# Patient Record
Sex: MALE | Race: OTHER | HISPANIC OR LATINO
[De-identification: names, ages, dates, MRNs, and addresses within clinical notes are randomized per-mention and may not be internally consistent; named-entity substitution may affect disease eponyms.]

---

## 2021-03-20 ENCOUNTER — APPOINTMENT (OUTPATIENT)
Age: 66
End: 2021-03-20
Payer: COMMERCIAL

## 2021-03-20 PROCEDURE — 0001A: CPT

## 2021-05-07 ENCOUNTER — APPOINTMENT (OUTPATIENT)
Dept: DISASTER EMERGENCY | Facility: CLINIC | Age: 66
End: 2021-05-07

## 2021-05-08 LAB — SARS-COV-2 N GENE NPH QL NAA+PROBE: NOT DETECTED

## 2021-05-10 ENCOUNTER — LABORATORY RESULT (OUTPATIENT)
Age: 66
End: 2021-05-10

## 2021-05-10 ENCOUNTER — APPOINTMENT (OUTPATIENT)
Dept: PULMONOLOGY | Facility: CLINIC | Age: 66
End: 2021-05-10
Payer: COMMERCIAL

## 2021-05-10 VITALS
DIASTOLIC BLOOD PRESSURE: 87 MMHG | HEIGHT: 63 IN | BODY MASS INDEX: 28.7 KG/M2 | SYSTOLIC BLOOD PRESSURE: 171 MMHG | HEART RATE: 84 BPM | TEMPERATURE: 98 F | WEIGHT: 162 LBS | OXYGEN SATURATION: 97 %

## 2021-05-10 VITALS — WEIGHT: 159 LBS | HEIGHT: 63 IN | TEMPERATURE: 98.1 F | HEART RATE: 81 BPM | BODY MASS INDEX: 28.17 KG/M2

## 2021-05-10 DIAGNOSIS — Z86.79 PERSONAL HISTORY OF OTHER DISEASES OF THE CIRCULATORY SYSTEM: ICD-10-CM

## 2021-05-10 DIAGNOSIS — Z87.891 PERSONAL HISTORY OF NICOTINE DEPENDENCE: ICD-10-CM

## 2021-05-10 DIAGNOSIS — Z00.00 ENCOUNTER FOR GENERAL ADULT MEDICAL EXAMINATION W/OUT ABNORMAL FINDINGS: ICD-10-CM

## 2021-05-10 DIAGNOSIS — Z86.39 PERSONAL HISTORY OF OTHER ENDOCRINE, NUTRITIONAL AND METABOLIC DISEASE: ICD-10-CM

## 2021-05-10 DIAGNOSIS — Z57.9 OCCUPATIONAL EXPOSURE TO UNSPECIFIED RISK FACTOR: ICD-10-CM

## 2021-05-10 PROCEDURE — 94726 PLETHYSMOGRAPHY LUNG VOLUMES: CPT

## 2021-05-10 PROCEDURE — 99072 ADDL SUPL MATRL&STAF TM PHE: CPT

## 2021-05-10 PROCEDURE — ZZZZZ: CPT

## 2021-05-10 PROCEDURE — 94729 DIFFUSING CAPACITY: CPT

## 2021-05-10 PROCEDURE — 94010 BREATHING CAPACITY TEST: CPT

## 2021-05-10 PROCEDURE — 99215 OFFICE O/P EST HI 40 MIN: CPT | Mod: 25

## 2021-05-10 RX ORDER — SILDENAFIL 20 MG/1
20 TABLET ORAL
Refills: 0 | Status: ACTIVE | COMMUNITY

## 2021-05-10 SDOH — HEALTH STABILITY - PHYSICAL HEALTH: OCCUPATIONAL EXPOSURE TO UNSPECIFIED RISK FACTOR: Z57.9

## 2021-05-10 NOTE — PHYSICAL EXAM
[No Acute Distress] : no acute distress [Normal Oropharynx] : normal oropharynx [III] : Mallampati Class: III [No Neck Mass] : no neck mass [No Abnormalities] : no abnormalities [Benign] : benign [Normal Gait] : normal gait [No Clubbing] : no clubbing [Normal Color/ Pigmentation] : normal color/ pigmentation [No Focal Deficits] : no focal deficits [Oriented x3] : oriented x3 [TextBox_68] : Bilateral fine crackles

## 2021-05-10 NOTE — HISTORY OF PRESENT ILLNESS
[TextBox_4] : This letter  is regarding your patient  who  attended pulmonary out patient office today.  I have reviewed  patient's  past history, social history, family history and medication list. I also  reviewed nurse practitioners/ and fellows  notes and assessment and agree with it.  \par The patient was referred by Dr.ALEX HODGE------- 65-year-old male history of interstitial lung disease ------------ no history of any collagen vascular disorder------- no family history of interstitial lung disease------ brother has lung cancer----------\par \par ------No history of , fever, chills , rigors, chest pain, or hemoptysis. Questionable history of Raynaud's phenomenon. No h/o significant weight loss in last few months. No history of liver dysfunction , collagen vascular disorder or chronic thromboembolic disease. I would classify the patient's dyspnea as WHO  FUNCTIONAL CLASS II--------\par \par ----Echo  date------ not available\par ----Pft date--------- May 2021-----borderline restrictive lung defect is \par ----Ct scan date------- 2021----MASON PERISI-----mild interstitial lung disease at the bases with regions of focal honeycombing -----------------------???   UIP pattern \par ---\par \par

## 2021-05-10 NOTE — DISCUSSION/SUMMARY
[FreeTextEntry1] : ---Assessment plan----------The patient has been referred here for further opinion regarding pulmonary problem,\par - 65-year-old male history of interstitial lung disease ------------ no history of any collagen vascular disorder--- CT suggestive of UIP pattern\par I need to review the CT scan myself to decide  IF if we need an open lung biopsy----or weakened state of and treated as a UIP pattern with antifibrotic treatment\par 1 labs\par 2 CPET\par 3 PSG\par 4-follow-up in 6 weeks\par \par Thanks for allowing  me to participate  in the care of this patient.  Patient at this time  will follow  the above mentioned recommendations and return back for follow up visit. If you have any questions  I can be reached  at #[phone] (beeper)  or  [phone] (office).\par \par Daniel Garzon MD, [facility] \par Director, Pulmonary Hypertension Program \par [facility] \par Division of Pulmonary, Critical Care and Sleep Medicine \par  Professor of Medicine \par [employer]\par

## 2021-05-10 NOTE — REVIEW OF SYSTEMS
[Cough] : cough [Fever] : no fever [Dry Eyes] : no dry eyes [Chest Discomfort] : no chest discomfort [Hay Fever] : no hay fever [GERD] : no gerd [Nocturia] : no nocturia [Arthralgias] : no arthralgias [Raynaud] : no raynaud [Anemia] : no anemia [Headache] : no headache [Depression] : no depression [Diabetes] : no diabetes

## 2021-05-11 LAB
25(OH)D3 SERPL-MCNC: 34.5 NG/ML
A1AT SERPL-MCNC: 134 MG/DL
ALBUMIN SERPL ELPH-MCNC: 4.8 G/DL
ALP BLD-CCNC: 89 U/L
ALT SERPL-CCNC: 16 U/L
ANION GAP SERPL CALC-SCNC: 15 MMOL/L
APTT BLD: 27.8 SEC
AST SERPL-CCNC: 18 U/L
BASOPHILS # BLD AUTO: 0.06 K/UL
BASOPHILS NFR BLD AUTO: 1 %
BILIRUB SERPL-MCNC: 0.2 MG/DL
BUN SERPL-MCNC: 17 MG/DL
CALCIUM SERPL-MCNC: 9.8 MG/DL
CALCIUM SERPL-MCNC: 9.8 MG/DL
CCP AB SER IA-ACNC: <8 UNITS
CHLORIDE SERPL-SCNC: 103 MMOL/L
CO2 SERPL-SCNC: 24 MMOL/L
CREAT SERPL-MCNC: 0.93 MG/DL
CRP SERPL-MCNC: <3 MG/L
DEPRECATED KAPPA LC FREE/LAMBDA SER: 1.06 RATIO
ENA SCL70 IGG SER IA-ACNC: <0.2 AL
ENA SS-A AB SER IA-ACNC: <0.2 AL
ENA SS-B AB SER IA-ACNC: <0.2 AL
EOSINOPHIL # BLD AUTO: 0.28 K/UL
EOSINOPHIL NFR BLD AUTO: 4.4 %
ERYTHROCYTE [SEDIMENTATION RATE] IN BLOOD BY WESTERGREN METHOD: 45 MM/HR
ESTIMATED AVERAGE GLUCOSE: 126 MG/DL
FOLATE RBC-MCNC: 1098 NG/ML
GLUCOSE SERPL-MCNC: 102 MG/DL
HBA1C MFR BLD HPLC: 6 %
HCT VFR BLD CALC: 44.9 %
HCT VFR BLD CALC: 45 %
HCYS SERPL-MCNC: 11.6 UMOL/L
HGB BLD-MCNC: 14.3 G/DL
IGA SER QL IEP: 264 MG/DL
IGG SER QL IEP: 1337 MG/DL
IGM SER QL IEP: 55 MG/DL
IMM GRANULOCYTES NFR BLD AUTO: 0.2 %
INR PPP: 0.94 RATIO
KAPPA LC CSF-MCNC: 1.24 MG/DL
KAPPA LC SERPL-MCNC: 1.32 MG/DL
LYMPHOCYTES # BLD AUTO: 1.46 K/UL
LYMPHOCYTES NFR BLD AUTO: 23.2 %
MAN DIFF?: NORMAL
MCHC RBC-ENTMCNC: 27.6 PG
MCHC RBC-ENTMCNC: 31.8 GM/DL
MCV RBC AUTO: 86.7 FL
MONOCYTES # BLD AUTO: 0.44 K/UL
MONOCYTES NFR BLD AUTO: 7 %
NEUTROPHILS # BLD AUTO: 4.05 K/UL
NEUTROPHILS NFR BLD AUTO: 64.2 %
NT-PROBNP SERPL-MCNC: 30 PG/ML
PARATHYROID HORMONE INTACT: 48 PG/ML
PHOSPHATE SERPL-MCNC: 2.9 MG/DL
PLATELET # BLD AUTO: 253 K/UL
POTASSIUM SERPL-SCNC: 4.5 MMOL/L
PROT SERPL-MCNC: 8 G/DL
PT BLD: 11.1 SEC
RBC # BLD: 5.19 M/UL
RBC # FLD: 12.6 %
RF+CCP IGG SER-IMP: NEGATIVE
RHEUMATOID FACT SER QL: <10 IU/ML
SODIUM SERPL-SCNC: 142 MMOL/L
T3 SERPL-MCNC: 132 NG/DL
T3FREE SERPL-MCNC: 3.36 PG/ML
T3RU NFR SERPL: 1.2 TBI
T4 FREE SERPL-MCNC: 0.9 NG/DL
TSH SERPL-ACNC: 2.02 UIU/ML
URATE SERPL-MCNC: 4.7 MG/DL
VIT B12 SERPL-MCNC: 542 PG/ML
WBC # FLD AUTO: 6.3 K/UL

## 2021-05-12 LAB
CARDIOLIPIN AB SER IA-ACNC: NEGATIVE
MPO AB + PR3 PNL SER: NORMAL

## 2021-05-13 LAB
ANACR T: NEGATIVE
M TB IFN-G BLD-IMP: POSITIVE
QUANTIFERON TB PLUS MITOGEN MINUS NIL: 1.77 IU/ML
QUANTIFERON TB PLUS NIL: 0.03 IU/ML
QUANTIFERON TB PLUS TB1 MINUS NIL: 0.5 IU/ML
QUANTIFERON TB PLUS TB2 MINUS NIL: 0.38 IU/ML
TOTAL IGE SMQN RAST: 68 KU/L

## 2021-05-18 LAB — T4 FREE SERPL DIALY-MCNC: 0.74 NG/DL

## 2021-05-20 PROBLEM — Z00.00 ENCOUNTER FOR PREVENTIVE HEALTH EXAMINATION: Status: ACTIVE | Noted: 2021-05-20

## 2021-05-25 LAB
EJ AB SER QL: NEGATIVE
ENA JO1 AB SER IA-ACNC: <20 UNITS
ENA PM/SCL AB SER-ACNC: <20 UNITS
ENA SM+RNP AB SER IA-ACNC: <20 UNITS
ENA SS-A IGG SER QL: <20 UNITS
FIBRILLARIN AB SER QL: NEGATIVE
KU AB SER QL: NEGATIVE
MDA-5 (P140)(CADM-140): <20 UNITS
MI2 AB SER QL: NEGATIVE
NXP-2 (P140): <20 UNITS
OJ AB SER QL: NEGATIVE
PL12 AB SER QL: NEGATIVE
PL7 AB SER QL: NEGATIVE
SRP AB SERPL QL: NEGATIVE
TIF GAMMA (P155/140): <20 UNITS
U2 SNRNP AB SER QL: NEGATIVE

## 2021-07-19 ENCOUNTER — APPOINTMENT (OUTPATIENT)
Dept: PULMONOLOGY | Facility: CLINIC | Age: 66
End: 2021-07-19
Payer: COMMERCIAL

## 2021-07-19 VITALS
DIASTOLIC BLOOD PRESSURE: 78 MMHG | TEMPERATURE: 97.1 F | HEART RATE: 56 BPM | WEIGHT: 159 LBS | SYSTOLIC BLOOD PRESSURE: 151 MMHG | OXYGEN SATURATION: 98 % | BODY MASS INDEX: 28.17 KG/M2 | HEIGHT: 63 IN

## 2021-07-19 PROCEDURE — 99214 OFFICE O/P EST MOD 30 MIN: CPT

## 2021-07-19 PROCEDURE — 99072 ADDL SUPL MATRL&STAF TM PHE: CPT

## 2021-07-19 NOTE — DISCUSSION/SUMMARY
[FreeTextEntry1] : ---Assessment plan----------The patient has been referred here for further opinion regarding pulmonary problem,\par - 65-year-old male history of interstitial lung disease ------------ no history of any collagen vascular disorder--- CT suggestive of UIP pattern\par I need to review the CT scan myself to decide  IF if we need an open lung biopsy----or  treated as a UIP pattern with antifibrotic treatment\par 1 REPEAT  CT CHEST \par 2 CPET\par 3 PSG\par 4-follow-up in 6 weeks\par \par Thanks for allowing  me to participate  in the care of this patient.  Patient at this time  will follow  the above mentioned recommendations and return back for follow up visit. If you have any questions  I can be reached  at #[phone] (beeper)  or  [phone] (office).\par \par Daniel Garzon MD, [facility] \par Director, Pulmonary Hypertension Program \par [facility] \par Division of Pulmonary, Critical Care and Sleep Medicine \par  Professor of Medicine \par [employer]\par

## 2021-08-04 ENCOUNTER — APPOINTMENT (OUTPATIENT)
Dept: SLEEP CENTER | Facility: CLINIC | Age: 66
End: 2021-08-04
Payer: COMMERCIAL

## 2021-08-04 ENCOUNTER — OUTPATIENT (OUTPATIENT)
Dept: OUTPATIENT SERVICES | Facility: HOSPITAL | Age: 66
LOS: 1 days | End: 2021-08-04
Payer: COMMERCIAL

## 2021-08-04 PROCEDURE — 95806 SLEEP STUDY UNATT&RESP EFFT: CPT

## 2021-08-04 PROCEDURE — 95806 SLEEP STUDY UNATT&RESP EFFT: CPT | Mod: 26

## 2021-08-06 ENCOUNTER — NON-APPOINTMENT (OUTPATIENT)
Age: 66
End: 2021-08-06

## 2021-08-10 DIAGNOSIS — G47.33 OBSTRUCTIVE SLEEP APNEA (ADULT) (PEDIATRIC): ICD-10-CM

## 2021-08-11 ENCOUNTER — NON-APPOINTMENT (OUTPATIENT)
Age: 66
End: 2021-08-11

## 2021-08-12 ENCOUNTER — OUTPATIENT (OUTPATIENT)
Dept: OUTPATIENT SERVICES | Facility: HOSPITAL | Age: 66
LOS: 1 days | End: 2021-08-12
Payer: COMMERCIAL

## 2021-08-12 ENCOUNTER — APPOINTMENT (OUTPATIENT)
Dept: CT IMAGING | Facility: CLINIC | Age: 66
End: 2021-08-12
Payer: COMMERCIAL

## 2021-08-12 DIAGNOSIS — Z00.8 ENCOUNTER FOR OTHER GENERAL EXAMINATION: ICD-10-CM

## 2021-08-12 DIAGNOSIS — J84.9 INTERSTITIAL PULMONARY DISEASE, UNSPECIFIED: ICD-10-CM

## 2021-08-12 PROCEDURE — 71250 CT THORAX DX C-: CPT | Mod: 26

## 2021-08-12 PROCEDURE — 71250 CT THORAX DX C-: CPT

## 2021-08-18 ENCOUNTER — TRANSCRIPTION ENCOUNTER (OUTPATIENT)
Age: 66
End: 2021-08-18

## 2021-09-20 NOTE — HISTORY OF PRESENT ILLNESS
[TextBox_4] : This letter  is regarding your patient  who  attended pulmonary out patient office today.  I have reviewed  patient's  past history, social history, family history and medication list. I also  reviewed nurse practitioners/ and fellows  notes and assessment and agree with it.  \par The patient was referred by Dr.ALEX HODGE------- 65-year-old male history of interstitial lung disease ------------ no history of any collagen vascular disorder------- no family history of interstitial lung disease------ brother has lung cancer----------\par \par ------No history of , fever, chills , rigors, chest pain, or hemoptysis. Questionable history of Raynaud's phenomenon. No h/o significant weight loss in last few months. No history of liver dysfunction , collagen vascular disorder or chronic thromboembolic disease. I would classify the patient's dyspnea as WHO  FUNCTIONAL CLASS II--------\par \par ----Echo  date------ not available\par ----Pft date--------- May 2021-----borderline restrictive lung defect is \par ----Ct scan date------- 2021----MASON PERISI-----mild interstitial lung disease at the bases with regions of focal honeycombing -----------------------???   UIP pattern \par ---\par \par  I have discussed with the Attending the patient's status, as well as the H&P and the fetal status. The Attending agreed with the suggested management.

## 2021-10-05 ENCOUNTER — APPOINTMENT (OUTPATIENT)
Dept: PULMONOLOGY | Facility: CLINIC | Age: 66
End: 2021-10-05

## 2021-10-05 ENCOUNTER — APPOINTMENT (OUTPATIENT)
Dept: PULMONOLOGY | Facility: CLINIC | Age: 66
End: 2021-10-05
Payer: COMMERCIAL

## 2021-10-05 ENCOUNTER — MED ADMIN CHARGE (OUTPATIENT)
Age: 66
End: 2021-10-05

## 2021-10-05 VITALS
TEMPERATURE: 97 F | SYSTOLIC BLOOD PRESSURE: 149 MMHG | WEIGHT: 159 LBS | BODY MASS INDEX: 28.17 KG/M2 | RESPIRATION RATE: 15 BRPM | DIASTOLIC BLOOD PRESSURE: 85 MMHG | HEIGHT: 63 IN | OXYGEN SATURATION: 99 % | HEART RATE: 60 BPM

## 2021-10-05 DIAGNOSIS — Z23 ENCOUNTER FOR IMMUNIZATION: ICD-10-CM

## 2021-10-05 PROCEDURE — 99215 OFFICE O/P EST HI 40 MIN: CPT | Mod: 25

## 2021-10-05 PROCEDURE — 90686 IIV4 VACC NO PRSV 0.5 ML IM: CPT

## 2021-10-05 PROCEDURE — ZZZZZ: CPT

## 2021-10-05 PROCEDURE — 94618 PULMONARY STRESS TESTING: CPT

## 2021-10-05 PROCEDURE — 99072 ADDL SUPL MATRL&STAF TM PHE: CPT

## 2021-10-05 PROCEDURE — G0008: CPT

## 2021-10-05 NOTE — HISTORY OF PRESENT ILLNESS
[TextBox_4] : This letter  is regarding your patient  who  attended pulmonary out patient office today.  I have reviewed  patient's  past history, social history, family history and medication list. I also  reviewed nurse practitioners/ and fellows  notes and assessment and agree with it.  \par The patient was referred by Dr.ALEX HODGE------- 65-year-old male history of interstitial lung disease ------------ no history of any collagen vascular disorder------- no family history of interstitial lung disease------ BROTHER HAS LUNG TRASNPLANT [CAUSE PT NOT SURE] -----\par \par ------No history of , fever, chills , rigors, chest pain, or hemoptysis. Questionable history of Raynaud's phenomenon. No h/o significant weight loss in last few months. No history of liver dysfunction , collagen vascular disorder or chronic thromboembolic disease. I would classify the patient's dyspnea as WHO  FUNCTIONAL CLASS II--------\par \par ----Echo  date------ not available\par ----Pft date--------- May 2021-----borderline restrictive lung defect is \par ----Ct scan date------- 2021----ZWANGER PERISI-----mild interstitial lung disease at the bases with regions of focal honeycombing -----6MWT  525 METERS NO  DESATURATION----------------\par ---\par \par ct chest 8/2021 \par IMPRESSION:\par Interstitial lung disease marked by peripheral reticulation, groundglass opacity and traction bronchiectasis with a lower lobe predominance. No honeycombing.\par \par \par hst did not show KWADWO\par \par 10/2021-- ILD--no resp complaints -NNEDS CPET ---NO KWADWO ON PSEG--------WILL TRY SA SHORT  COURSE OF STEROIDS  ---QUNAT GOLD POSITIVE\par \par

## 2021-10-05 NOTE — DISCUSSION/SUMMARY
[FreeTextEntry1] : ---Assessment plan----------The patient has been referred here for further opinion regarding pulmonary problem,\par - 65-year-old male history of interstitial lung disease ------------ no history of any collagen vascular disorder--- CT suggestive of UIP pattern  ---[ BROTEHRHAS LUNG TRANSPLANT   CAUSE NOT CLEAR]-----\par \par 1   prenisosne 10 mg po qd 6 weeks\par 2 echo\par 3 cpet\par 4  influenza vac given today\par 5 QUANTGOLD  positive---will   try Rifapentine and INH  weeekly regimen for 3 months ---will   start after the prednisone course is finished-\par \par f/u in 6 weeks\par Thanks for allowing  me to participate  in the care of this patient.  Patient at this time  will follow  the above mentioned recommendations and return back for follow up visit. If you have any questions  I can be reached  at #[phone] (beeper)  or  [phone] (office).\par \par Daniel Garzon MD, FCCP \par Director, Pulmonary Hypertension Program \par [facility] \par Division of Pulmonary, Critical Care and Sleep Medicine \par  Professor of Medicine \par [employer]\par

## 2021-10-25 ENCOUNTER — APPOINTMENT (OUTPATIENT)
Dept: PULMONOLOGY | Facility: CLINIC | Age: 66
End: 2021-10-25

## 2021-10-29 ENCOUNTER — APPOINTMENT (OUTPATIENT)
Dept: DISASTER EMERGENCY | Facility: CLINIC | Age: 66
End: 2021-10-29

## 2021-10-29 ENCOUNTER — APPOINTMENT (OUTPATIENT)
Dept: CARDIOLOGY | Facility: CLINIC | Age: 66
End: 2021-10-29
Payer: COMMERCIAL

## 2021-10-29 DIAGNOSIS — R01.1 CARDIAC MURMUR, UNSPECIFIED: ICD-10-CM

## 2021-10-29 PROCEDURE — 93306 TTE W/DOPPLER COMPLETE: CPT

## 2021-10-29 PROCEDURE — 99072 ADDL SUPL MATRL&STAF TM PHE: CPT

## 2021-10-31 PROBLEM — R01.1 MURMUR: Status: ACTIVE | Noted: 2021-10-31

## 2021-11-01 ENCOUNTER — NON-APPOINTMENT (OUTPATIENT)
Age: 66
End: 2021-11-01

## 2021-11-01 ENCOUNTER — TRANSCRIPTION ENCOUNTER (OUTPATIENT)
Age: 66
End: 2021-11-01

## 2021-11-09 ENCOUNTER — APPOINTMENT (OUTPATIENT)
Dept: PULMONOLOGY | Facility: CLINIC | Age: 66
End: 2021-11-09
Payer: COMMERCIAL

## 2021-11-09 VITALS
DIASTOLIC BLOOD PRESSURE: 89 MMHG | HEART RATE: 67 BPM | SYSTOLIC BLOOD PRESSURE: 149 MMHG | HEIGHT: 63 IN | BODY MASS INDEX: 28.35 KG/M2 | RESPIRATION RATE: 16 BRPM | TEMPERATURE: 97.2 F | OXYGEN SATURATION: 97 % | WEIGHT: 160 LBS

## 2021-11-09 PROCEDURE — 99214 OFFICE O/P EST MOD 30 MIN: CPT

## 2021-11-09 PROCEDURE — 99072 ADDL SUPL MATRL&STAF TM PHE: CPT

## 2021-11-09 NOTE — HISTORY OF PRESENT ILLNESS
[TextBox_4] : This letter  is regarding your patient  who  attended pulmonary out patient office today.  I have reviewed  patient's  past history, social history, family history and medication list. I also  reviewed nurse practitioners/ and fellows  notes and assessment and agree with it.  \par The patient was referred by Dr.ALEX HODGE------- 66-year-old male history of interstitial lung disease ------------ no history of any collagen vascular disorder------- no family history of interstitial lung disease------ BROTHER HAS LUNG TRASNPLANT [CAUSE PT NOT SURE] -----\par \par ------No history of , fever, chills , rigors, chest pain, or hemoptysis. Questionable history of Raynaud's phenomenon. No h/o significant weight loss in last few months. No history of liver dysfunction , collagen vascular disorder or chronic thromboembolic disease. I would classify the patient's dyspnea as WHO  FUNCTIONAL CLASS II--------\par \par ----Echo  date------ 2021  ef 66,pasp44\par ----Pft date--------- May 2021-----borderline restrictive lung defect is \par ----Ct scan date------- 2021----ZWANGER PERISI-----mild interstitial lung disease at the bases with regions of focal honeycombing -----6MWT  525 METERS NO  DESATURATION----------------\par ---\par \par ct chest 8/2021 \par IMPRESSION:\par Interstitial lung disease marked by peripheral reticulation, groundglass opacity and traction bronchiectasis with a lower lobe predominance. No honeycombing.\par \par \par hst did not show KWADWO\par \par 10/2021-- ILD--no resp complaints -NNEDS CPET ---NO KWADWO ON PSEG--------WILL TRY SA SHORT  COURSE OF STEROIDS  ---QUNAT GOLD POSITIVE\par \par 11/2021----ILD--no resp complaints felt better on steroids----   we will continue on low-dose 7.5 mg p.o. daily-----NEEDS CPET ---NO KWADWO ON PSEG-------  ---QUNAT GOLD POSITIVE\par

## 2021-11-09 NOTE — DISCUSSION/SUMMARY
[FreeTextEntry1] : ---Assessment plan----------The patient has been referred here for further opinion regarding pulmonary problem,\par - 66-year-old male history of interstitial lung disease ------------ no history of any collagen vascular disorder--- CT suggestive of UIP pattern  ---[ EVANGELIST  HAS  IPF]\par \par Call you with [place] and then it was like a 20minute thank you HAS LUNG TRANSPLANT   CAUSE NOT CLEAR]-----\par \par 1   preDnisone 7.5  mg po qd\par 2 echo -NO PAH Belia\par 3 cpet\par 4  influenza vac given today\par 5 QUANTGOLD  positive---will   try Rifapentine and INH  weeekly regimen for 3 months ---will   start after the prednisone course is finished-\par \par f/u in 6 weeks\par Thanks for allowing  me to participate  in the care of this patient.  Patient at this time  will follow  the above mentioned recommendations and return back for follow up visit. If you have any questions  I can be reached  at #[phone] (beeper)  or  [phone] (office).\par \par Daniel Garzon MD, FCCP \par Director, Pulmonary Hypertension Program \par [facility] \par Division of Pulmonary, Critical Care and Sleep Medicine \par  Professor of Medicine \par [employer]\par

## 2021-11-12 LAB
M TB IFN-G BLD-IMP: POSITIVE
QUANTIFERON TB PLUS MITOGEN MINUS NIL: 7.64 IU/ML
QUANTIFERON TB PLUS NIL: 0.06 IU/ML
QUANTIFERON TB PLUS TB1 MINUS NIL: 0.65 IU/ML
QUANTIFERON TB PLUS TB2 MINUS NIL: 0.82 IU/ML

## 2021-12-19 ENCOUNTER — NON-APPOINTMENT (OUTPATIENT)
Age: 66
End: 2021-12-19

## 2021-12-20 ENCOUNTER — APPOINTMENT (OUTPATIENT)
Dept: DISASTER EMERGENCY | Facility: CLINIC | Age: 66
End: 2021-12-20

## 2021-12-22 ENCOUNTER — APPOINTMENT (OUTPATIENT)
Dept: PULMONOLOGY | Facility: CLINIC | Age: 66
End: 2021-12-22
Payer: MEDICARE

## 2021-12-22 PROCEDURE — 94621 CARDIOPULM EXERCISE TESTING: CPT

## 2021-12-22 PROCEDURE — 94375 RESPIRATORY FLOW VOLUME LOOP: CPT

## 2022-01-14 RX ORDER — ROSUVASTATIN CALCIUM 10 MG/1
10 TABLET, FILM COATED ORAL
Refills: 0 | Status: ACTIVE | COMMUNITY

## 2022-01-25 ENCOUNTER — APPOINTMENT (OUTPATIENT)
Dept: PULMONOLOGY | Facility: CLINIC | Age: 67
End: 2022-01-25
Payer: MEDICARE

## 2022-01-25 VITALS
HEART RATE: 75 BPM | WEIGHT: 165 LBS | RESPIRATION RATE: 16 BRPM | TEMPERATURE: 98.6 F | HEIGHT: 63 IN | SYSTOLIC BLOOD PRESSURE: 145 MMHG | DIASTOLIC BLOOD PRESSURE: 78 MMHG | BODY MASS INDEX: 29.23 KG/M2

## 2022-01-25 PROCEDURE — 99214 OFFICE O/P EST MOD 30 MIN: CPT

## 2022-01-25 NOTE — REVIEW OF SYSTEMS
[Fever] : no fever [Dry Eyes] : no dry eyes [Cough] : no cough [Chest Discomfort] : no chest discomfort [Hay Fever] : no hay fever [GERD] : no gerd [Nocturia] : no nocturia [Arthralgias] : no arthralgias [Raynaud] : no raynaud [Anemia] : no anemia [Headache] : no headache [Depression] : no depression [Diabetes] : no diabetes

## 2022-01-25 NOTE — DISCUSSION/SUMMARY
[FreeTextEntry1] : ---Assessment plan----------The patient has been referred here for further opinion regarding pulmonary problem,\par - 66-year-old male history of interstitial lung disease ------------ no history of any collagen vascular disorder--- CT suggestive of UIP pattern  ---[ EVANGELIST  HAS  IPF]\par \par Call you with the valley and then it was like a 20minute thank you HAS LUNG TRANSPLANT   CAUSE NOT CLEAR]-----\par \par 1   prednisone lower to 5mg for two months and then stop, aware to call if symptoms reoccur once off steroids\par 2   CT before next office visit\par 3 QUANTGOLD  positive---will consider Rifapentine and INH weekly regimen for 3 months pending lung CT results\par \par f/u in 5 months\par Thanks for allowing  me to participate  in the care of this patient.  Patient at this time  will follow  the above mentioned recommendations and return back for follow up visit. If you have any questions  I can be reached  at #[phone] (beeper)  or  [phone] (office).\par \par Daniel Garzon MD, FCCP \par Director, Pulmonary Hypertension Program \par [facility] \par Division of Pulmonary, Critical Care and Sleep Medicine \par  Professor of Medicine \par [employer]\par

## 2022-01-25 NOTE — HISTORY OF PRESENT ILLNESS
[TextBox_4] : This letter  is regarding your patient  who  attended pulmonary out patient office today.  I have reviewed  patient's  past history, social history, family history and medication list. I also  reviewed nurse practitioners/ and fellows  notes and assessment and agree with it.  \par The patient was referred by Dr.ALEX HODGE------- 66-year-old male history of interstitial lung disease ------------ no history of any collagen vascular disorder------- no family history of interstitial lung disease------ BROTHER HAS LUNG TRASNPLANT [CAUSE PT NOT SURE] -----\par \par ------No history of , fever, chills , rigors, chest pain, or hemoptysis. Questionable history of Raynaud's phenomenon. No h/o significant weight loss in last few months. No history of liver dysfunction , collagen vascular disorder or chronic thromboembolic disease. I would classify the patient's dyspnea as WHO  FUNCTIONAL CLASS II--------\par \par ----Echo  date------ 2021  ef 66,pasp44\par ----Pft date--------- May 2021-----borderline restrictive lung defect is \par ----Ct scan date------- 2021----ZWANGER PERISI-----mild interstitial lung disease at the bases with regions of focal honeycombing -----6MWT  525 METERS NO  DESATURATION----------------\par ---\par \par ct chest 8/2021 \par IMPRESSION:\par Interstitial lung disease marked by peripheral reticulation, groundglass opacity and traction bronchiectasis with a lower lobe predominance. No honeycombing.\par \par \par hst did not show KWADWO\par \par 10/2021-- ILD--no resp complaints -NNEDS CPET ---NO KWADWO ON PSEG--------WILL TRY SA SHORT  COURSE OF STEROIDS  ---QUNAT GOLD POSITIVE\par \par 11/2021----ILD--no resp complaints felt better on steroids----   we will continue on low-dose 7.5 mg p.o. daily-----NEEDS CPET ---NO KWADWO ON PSEG-------  ---QUNAT GOLD POSITIVE\par \par 1/2022 ILD-- no resp complaints, continues on pred 7.5mg qd, CPET and 6 minute walk testing reviewed with patient both results wnl

## 2022-01-26 ENCOUNTER — APPOINTMENT (OUTPATIENT)
Dept: CARDIOLOGY | Facility: CLINIC | Age: 67
End: 2022-01-26
Payer: MEDICARE

## 2022-01-26 ENCOUNTER — NON-APPOINTMENT (OUTPATIENT)
Age: 67
End: 2022-01-26

## 2022-01-26 VITALS
BODY MASS INDEX: 29.41 KG/M2 | HEIGHT: 63 IN | OXYGEN SATURATION: 100 % | HEART RATE: 68 BPM | DIASTOLIC BLOOD PRESSURE: 83 MMHG | SYSTOLIC BLOOD PRESSURE: 169 MMHG

## 2022-01-26 VITALS — BODY MASS INDEX: 29.41 KG/M2 | WEIGHT: 166 LBS

## 2022-01-26 VITALS — SYSTOLIC BLOOD PRESSURE: 154 MMHG | DIASTOLIC BLOOD PRESSURE: 73 MMHG

## 2022-01-26 PROCEDURE — 99204 OFFICE O/P NEW MOD 45 MIN: CPT

## 2022-01-26 PROCEDURE — 93000 ELECTROCARDIOGRAM COMPLETE: CPT

## 2022-01-27 ENCOUNTER — NON-APPOINTMENT (OUTPATIENT)
Age: 67
End: 2022-01-27

## 2022-01-31 NOTE — DISCUSSION/SUMMARY
[FreeTextEntry1] : PHT: at this time patient has minimal symptoms and is following up with Dr Garzon\par \par HTN: BP is persistently elevated. I recommend that we manage his BP aggressively. Add Lisinopril 10mg po daily. \par \par FU in 8 weeks.

## 2022-01-31 NOTE — PHYSICAL EXAM
[Well Developed] : well developed [Well Nourished] : well nourished [No Acute Distress] : no acute distress [Normal Conjunctiva] : normal conjunctiva [Normal Venous Pressure] : normal venous pressure [No Carotid Bruit] : no carotid bruit [Normal S1, S2] : normal S1, S2 [No Murmur] : no murmur [No Rub] : no rub [No Gallop] : no gallop [Soft] : abdomen soft [Non Tender] : non-tender [No Masses/organomegaly] : no masses/organomegaly [Normal Bowel Sounds] : normal bowel sounds [Normal Gait] : normal gait [No Edema] : no edema [No Cyanosis] : no cyanosis [No Clubbing] : no clubbing [No Varicosities] : no varicosities [No Rash] : no rash [No Skin Lesions] : no skin lesions [Moves all extremities] : moves all extremities [No Focal Deficits] : no focal deficits [Normal Speech] : normal speech [Alert and Oriented] : alert and oriented [Normal memory] : normal memory [de-identified] : B/L  fine crackles at the bases.

## 2022-01-31 NOTE — HISTORY OF PRESENT ILLNESS
[FreeTextEntry1] : 66 yr M with HTN , IPF with no known CV ds. He is here for CV screening. \par Patient states that he is overall feeling well.\par His BP remains elevated.

## 2022-02-01 ENCOUNTER — RX RENEWAL (OUTPATIENT)
Age: 67
End: 2022-02-01

## 2022-02-09 ENCOUNTER — APPOINTMENT (OUTPATIENT)
Dept: CARDIOLOGY | Facility: CLINIC | Age: 67
End: 2022-02-09
Payer: MEDICARE

## 2022-02-09 ENCOUNTER — NON-APPOINTMENT (OUTPATIENT)
Age: 67
End: 2022-02-09

## 2022-02-09 VITALS
DIASTOLIC BLOOD PRESSURE: 74 MMHG | OXYGEN SATURATION: 98 % | SYSTOLIC BLOOD PRESSURE: 128 MMHG | BODY MASS INDEX: 28.97 KG/M2 | HEIGHT: 63 IN | HEART RATE: 51 BPM | WEIGHT: 163.5 LBS

## 2022-02-09 PROCEDURE — 99213 OFFICE O/P EST LOW 20 MIN: CPT

## 2022-02-09 PROCEDURE — 93000 ELECTROCARDIOGRAM COMPLETE: CPT

## 2022-02-09 NOTE — DISCUSSION/SUMMARY
[FreeTextEntry1] : PHT: At this time I am deferring management for his pulmonary hypertension to the primary team.\par \par HTN: Blood pressure is much better controlled with combination of amlodipine and lisinopril, he will continue taking the current doses in addition to work on his diet lifestyle with salt restriction weight management and regular exercises and sleep hygiene.. \par \par Follow-up with us in 6 months

## 2022-02-09 NOTE — HISTORY OF PRESENT ILLNESS
[FreeTextEntry1] : 66 yr M with HTN , IPF with no known CV ds. He is here for CV screening. \par \par \par As documented on the last visit patient states that he is very active he can walk 2-3 flights of stairs and denies any symptoms of dyspnea or chest pain palpitations syncope or presyncope.  His blood pressure was elevated persistently so we added lisinopril 10 mg daily and today his blood pressure is much better controlled.  Overall patient is much happier with his blood pressure management and he says that this is the best he has felt.  At this time from my standpoint I am not ordering any more testing or interventions I am deferring his management to his pulmonologist

## 2022-02-09 NOTE — PHYSICAL EXAM
[Well Developed] : well developed [Well Nourished] : well nourished [No Acute Distress] : no acute distress [Normal Conjunctiva] : normal conjunctiva [Normal Venous Pressure] : normal venous pressure [No Carotid Bruit] : no carotid bruit [Normal S1, S2] : normal S1, S2 [No Murmur] : no murmur [No Rub] : no rub [No Gallop] : no gallop [Soft] : abdomen soft [Non Tender] : non-tender [No Masses/organomegaly] : no masses/organomegaly [Normal Bowel Sounds] : normal bowel sounds [Normal Gait] : normal gait [No Edema] : no edema [No Cyanosis] : no cyanosis [No Clubbing] : no clubbing [No Varicosities] : no varicosities [No Rash] : no rash [No Skin Lesions] : no skin lesions [Moves all extremities] : moves all extremities [No Focal Deficits] : no focal deficits [Normal Speech] : normal speech [Alert and Oriented] : alert and oriented [Normal memory] : normal memory [de-identified] : B/L  fine crackles at the bases.

## 2022-02-17 ENCOUNTER — RX CHANGE (OUTPATIENT)
Age: 67
End: 2022-02-17

## 2022-03-08 ENCOUNTER — APPOINTMENT (OUTPATIENT)
Dept: CV DIAGNOSITCS | Facility: HOSPITAL | Age: 67
End: 2022-03-08

## 2022-03-08 ENCOUNTER — OUTPATIENT (OUTPATIENT)
Dept: OUTPATIENT SERVICES | Facility: HOSPITAL | Age: 67
LOS: 1 days | End: 2022-03-08
Payer: MEDICARE

## 2022-03-08 DIAGNOSIS — R07.9 CHEST PAIN, UNSPECIFIED: ICD-10-CM

## 2022-03-08 PROCEDURE — 93350 STRESS TTE ONLY: CPT | Mod: 26

## 2022-03-08 PROCEDURE — 93320 DOPPLER ECHO COMPLETE: CPT | Mod: 26,GC

## 2022-03-08 PROCEDURE — 93325 DOPPLER ECHO COLOR FLOW MAPG: CPT | Mod: 26,GC

## 2022-04-13 ENCOUNTER — OUTPATIENT (OUTPATIENT)
Dept: OUTPATIENT SERVICES | Facility: HOSPITAL | Age: 67
LOS: 1 days | End: 2022-04-13
Payer: COMMERCIAL

## 2022-04-13 ENCOUNTER — APPOINTMENT (OUTPATIENT)
Dept: CT IMAGING | Facility: CLINIC | Age: 67
End: 2022-04-13
Payer: MEDICARE

## 2022-04-13 DIAGNOSIS — Z00.8 ENCOUNTER FOR OTHER GENERAL EXAMINATION: ICD-10-CM

## 2022-04-13 PROCEDURE — 71250 CT THORAX DX C-: CPT | Mod: 26

## 2022-04-13 PROCEDURE — 71250 CT THORAX DX C-: CPT

## 2022-04-28 ENCOUNTER — NON-APPOINTMENT (OUTPATIENT)
Age: 67
End: 2022-04-28

## 2022-06-13 ENCOUNTER — APPOINTMENT (OUTPATIENT)
Dept: PULMONOLOGY | Facility: CLINIC | Age: 67
End: 2022-06-13
Payer: MEDICARE

## 2022-06-13 VITALS
TEMPERATURE: 97.5 F | DIASTOLIC BLOOD PRESSURE: 74 MMHG | HEIGHT: 63 IN | SYSTOLIC BLOOD PRESSURE: 125 MMHG | HEART RATE: 64 BPM | BODY MASS INDEX: 28.17 KG/M2 | OXYGEN SATURATION: 98 % | WEIGHT: 159 LBS

## 2022-06-13 LAB
BASOPHILS # BLD AUTO: 0.06 K/UL
BASOPHILS NFR BLD AUTO: 0.9 %
EOSINOPHIL # BLD AUTO: 0.27 K/UL
EOSINOPHIL NFR BLD AUTO: 4.3 %
HCT VFR BLD CALC: 39.6 %
HGB BLD-MCNC: 13 G/DL
IMM GRANULOCYTES NFR BLD AUTO: 0.3 %
LYMPHOCYTES # BLD AUTO: 1.71 K/UL
LYMPHOCYTES NFR BLD AUTO: 27.1 %
MAN DIFF?: NORMAL
MCHC RBC-ENTMCNC: 28.3 PG
MCHC RBC-ENTMCNC: 32.8 GM/DL
MCV RBC AUTO: 86.3 FL
MONOCYTES # BLD AUTO: 0.45 K/UL
MONOCYTES NFR BLD AUTO: 7.1 %
NEUTROPHILS # BLD AUTO: 3.81 K/UL
NEUTROPHILS NFR BLD AUTO: 60.3 %
PLATELET # BLD AUTO: 237 K/UL
RBC # BLD: 4.59 M/UL
RBC # FLD: 12.5 %
WBC # FLD AUTO: 6.32 K/UL

## 2022-06-13 PROCEDURE — 99215 OFFICE O/P EST HI 40 MIN: CPT | Mod: 25

## 2022-06-13 NOTE — DISCUSSION/SUMMARY
[FreeTextEntry1] : ---Assessment plan----------The patient has been referred here for further opinion regarding pulmonary problem,\par - 66-year-old male history of interstitial lung disease ------------ no history of any collagen vascular disorder--- CT suggestive of UIP pattern  ---[ BROTEHR  HAS  IPF]----\par \par 1 ILD ----off pred, no resp complaints --- refer to Dr. Borjas  for bronchoscopy and EVISIA STUDY------\par 2   PFT  before next office visit\par 3 QUANTGOLD  positive---will consider Rifapentine and INH weekly regimen for 3 months pending lung CT results\par 4- labs drawn in our office today \par 5- 6- f/u 9/2022\par \par Thanks for allowing  me to participate  in the care of this patient.  Patient at this time  will follow  the above mentioned recommendations and return back for follow up visit. If you have any questions  I can be reached  at #[phone] (beeper)  or  [phone] (office).\par \par Daniel Garzon MD, FCCP \par Director, Pulmonary Hypertension Program \par [facility] \par Division of Pulmonary, Critical Care and Sleep Medicine \par  Professor of Medicine \par [employer]\par \par MADDIE Olsen\par \par

## 2022-06-13 NOTE — HISTORY OF PRESENT ILLNESS
[TextBox_4] : This letter  is regarding your patient  who  attended pulmonary out patient office today.  I have reviewed  patient's  past history, social history, family history and medication list. I also  reviewed nurse practitioners/ and fellows  notes and assessment and agree with it.  \par The patient was referred by Dr.ALEX HODGE------- 66-year-old male history of interstitial lung disease ------------ no history of any collagen vascular disorder------- no family history of interstitial lung disease------ BROTHER HAS LUNG TRASNPLANT [CAUSE PT NOT SURE] -----\par \par ------No history of , fever, chills , rigors, chest pain, or hemoptysis. Questionable history of Raynaud's phenomenon. No h/o significant weight loss in last few months. No history of liver dysfunction , collagen vascular disorder or chronic thromboembolic disease. I would classify the patient's dyspnea as WHO  FUNCTIONAL CLASS II--------\par \par ----Echo  date------ 2021  ef 66,pasp44\par \par ---stress echo 3/2022 \par Conclusions: \par 1. Exercise capacity is 8 METS, which is fair for age and\par gender.\par 2. Normal hemodynamic response.\par 3. Normal electrocardiographic response.\par 4. Normal augmentation in left ventricular systolic\par function.\par 5. Appropriate heart rate response.\par 6. Appropriate blood pressure response.\par 7. Normal stress echocardiogram with no evidence of\par inducible ischemia.  Baseline images demonstrated mild\par pulmonary hypertension (estimated pulmonary artery systolic\par pressure 41 mmHg).  The estimated pulmonary artery systolic\par pressure did not change significantly following peak\par exercise.\par \par ----Pft date--------- May 2021-----borderline restrictive lung defect is \par ----Ct scan date------- 2021----ZWANGER PERISI-----mild interstitial lung disease at the bases with regions of focal honeycombing -----6MWT  525 METERS NO  DESATURATION----------------\par ---\par \par ct chest 8/2021 \par IMPRESSION:\par Interstitial lung disease marked by peripheral reticulation, groundglass opacity and traction bronchiectasis with a lower lobe predominance. No honeycombing.\par \par ct chest 4/2022\par IMPRESSION:.\par Findings of pulmonary fibrosis unchanged compared to 8/12/2021\par \par hst did not show KWADWO\par \par 10/2021-- ILD--no resp complaints -NNEDS CPET ---NO KWADWO ON PSEG--------WILL TRY SA SHORT  COURSE OF STEROIDS  ---QUNAT GOLD POSITIVE\par \par 11/2021----ILD--no resp complaints felt better on steroids----   we will continue on low-dose 7.5 mg p.o. daily-----NEEDS CPET ---NO KWADWO ON PSEG-------  ---QUNAT GOLD POSITIVE\par \par 1/2022 ILD-- no resp complaints, continues on pred 7.5mg qd, CPET and 6 minute walk testing reviewed with patient both results wnl\par \par 6/2022 ILD--off pred, no resp complaints --- refer to Dr. Leon agreeable for bronchoscopy and EVISIA STUDY-----

## 2022-06-13 NOTE — PHYSICAL EXAM
[No Acute Distress] : no acute distress [Normal Oropharynx] : normal oropharynx [III] : Mallampati Class: III [No Neck Mass] : no neck mass [No Abnormalities] : no abnormalities [Benign] : benign [No Clubbing] : no clubbing [Normal Gait] : normal gait [Normal Color/ Pigmentation] : normal color/ pigmentation [No Focal Deficits] : no focal deficits [Oriented x3] : oriented x3 [TextBox_68] : Bilateral fine crackles

## 2022-06-14 LAB
ALBUMIN SERPL ELPH-MCNC: 4.8 G/DL
ALP BLD-CCNC: 75 U/L
ALT SERPL-CCNC: 12 U/L
ANION GAP SERPL CALC-SCNC: 14 MMOL/L
AST SERPL-CCNC: 14 U/L
BILIRUB SERPL-MCNC: 0.2 MG/DL
BUN SERPL-MCNC: 17 MG/DL
CALCIUM SERPL-MCNC: 9.5 MG/DL
CHLORIDE SERPL-SCNC: 102 MMOL/L
CO2 SERPL-SCNC: 24 MMOL/L
CREAT SERPL-MCNC: 0.95 MG/DL
EGFR: 89 ML/MIN/1.73M2
GLUCOSE SERPL-MCNC: 112 MG/DL
POTASSIUM SERPL-SCNC: 4.3 MMOL/L
PROT SERPL-MCNC: 7.2 G/DL
SARS-COV-2 N GENE NPH QL NAA+PROBE: NOT DETECTED
SODIUM SERPL-SCNC: 140 MMOL/L

## 2022-06-15 ENCOUNTER — APPOINTMENT (OUTPATIENT)
Dept: PULMONOLOGY | Facility: CLINIC | Age: 67
End: 2022-06-15
Payer: MEDICARE

## 2022-06-15 PROCEDURE — 94010 BREATHING CAPACITY TEST: CPT

## 2022-06-15 PROCEDURE — 94726 PLETHYSMOGRAPHY LUNG VOLUMES: CPT

## 2022-06-15 PROCEDURE — 94729 DIFFUSING CAPACITY: CPT

## 2022-06-20 ENCOUNTER — APPOINTMENT (OUTPATIENT)
Dept: PULMONOLOGY | Facility: CLINIC | Age: 67
End: 2022-06-20
Payer: MEDICARE

## 2022-06-20 VITALS
OXYGEN SATURATION: 98 % | WEIGHT: 159 LBS | HEIGHT: 63 IN | HEART RATE: 64 BPM | DIASTOLIC BLOOD PRESSURE: 83 MMHG | SYSTOLIC BLOOD PRESSURE: 133 MMHG | RESPIRATION RATE: 16 BRPM | TEMPERATURE: 98.2 F | BODY MASS INDEX: 28.17 KG/M2

## 2022-06-20 DIAGNOSIS — R06.00 DYSPNEA, UNSPECIFIED: ICD-10-CM

## 2022-06-20 DIAGNOSIS — Z01.818 ENCOUNTER FOR OTHER PREPROCEDURAL EXAMINATION: ICD-10-CM

## 2022-06-20 PROCEDURE — 99215 OFFICE O/P EST HI 40 MIN: CPT

## 2022-06-20 NOTE — ASSESSMENT
[FreeTextEntry1] : 65 y/o Male PMH interstitial lung disease who presents to interventional pulmonology clinic for evaluation for lung biopsy. \par \par With discussed the diagnostic yield of transbronchial biopsy of interstitial lung disease especially UIP, we discussed that we can use an vasogenic  to increase diagnostic yield and assist in the patient with probable.  In addition we as discussed that the patient is continuing COVID-positive and therefore we can send the bronchoalveolar lavage to rule out tuberculosis.  This is especially relevant given the patient is going to be on immunosuppressive therapy and antifibrotic therapy possibly.\par \par The risk and benefits of bronchoscopy with transbronchial biopsy including risk of bleeding and  risk pneumothorax was discussed with the patient and they demonstrated understanding.  The patient is agreeable to proceed at this time.\par \par Will need medical clearance. Will need COVID swab and presurgical testing prior to scheduling the procedure. The office will co-ordinate testing and pre-surgical appointment.\par \par Steve Falcon MD\par Director of Interventional Pulmonology & Bronchoscopy\par . \par Division of Pulmonary, Critical Care & Sleep Medicine\par [facility].\par \par \par

## 2022-06-20 NOTE — CONSULT LETTER
[Dear  ___] : Dear  [unfilled], [Consult Letter:] : I had the pleasure of evaluating your patient, [unfilled]. [Please see my note below.] : Please see my note below. [Consult Closing:] : Thank you very much for allowing me to participate in the care of this patient.  If you have any questions, please do not hesitate to contact me. [Sincerely,] : Sincerely, [FreeTextEntry3] : Steve Falcon MD\par Director of Interventional Pulmonology & Bronchoscopy\par . \par Division of Pulmonary, Critical Care & Sleep Medicine\par [facility].\par \par

## 2022-06-20 NOTE — HISTORY OF PRESENT ILLNESS
[TextBox_4] : Interventional Pulmonology Consultation/Visit Note\par \par 67YO Male PMH interstitial lung disease who presents to interventional pulmonology clinic for evaluation for lung biopsy with possible Envisia evaluation of ILD and to assess for UIP. \par \par Pt had PFTs done 6/15/2022 showing mild restrictive lung disease with reduced DLCO. CT chest done 4/2022 similar to previous CT chests showing coarse interstitial markings with signs of honey combing towards the bilateral lung bases. Findings appear to suggest UIP. Patient was referred to the IP clinic given no definitive diagnosis with diagnosis of probably UIP to see if biopsy can assist and diagnosis and further management. 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

## 2022-06-20 NOTE — REVIEW OF SYSTEMS
[Fever] : no fever [Fatigue] : no fatigue [Poor Appetite] : no poor appetite [Dry Eyes] : no dry eyes [Cough] : cough [Frequent URIs] : no frequent URIs [Sputum] : no sputum [Dyspnea] : dyspnea [Pleuritic Pain] : no pleuritic pain [Wheezing] : no wheezing [SOB on Exertion] : sob on exertion [Chest Discomfort] : no chest discomfort [Orthopnea] : no orthopnea [Palpitations] : no palpitations [Hay Fever] : no hay fever [GERD] : no gerd [Nocturia] : no nocturia [Frequency] : no dysuria [Arthralgias] : no arthralgias [Trauma/ Injury] : no trauma/ injury [Raynaud] : no raynaud [Rash] : no rash [Telangiectasias] : no telangiectasias [Anemia] : no anemia [Headache] : no headache [Focal Weakness] : no focal weakness [Depression] : no depression [Anxiety] : no anxiety [Diabetes] : no diabetes [Thyroid Problem] : no thyroid problem

## 2022-06-20 NOTE — DISCUSSION/SUMMARY
[FreeTextEntry1] : CT scan from June the procedure was independently reviewed and demonstrated mild interstitial lung disease at the bases with regions of focal honeycombing.

## 2022-06-20 NOTE — PHYSICAL EXAM
[No Acute Distress] : no acute distress [Normal Oropharynx] : normal oropharynx [III] : Mallampati Class: III [Normal Appearance] : normal appearance [Normal Rate/Rhythm] : normal rate/rhythm [Normal Pulses] : normal pulses [Normal S1, S2] : normal s1, s2 [No Resp Distress] : no resp distress [No Acc Muscle Use] : no acc muscle use [Normal Palpation] : normal palpation [Normal Rhythm and Effort] : normal rhythm and effort [Normal to Percussion] : normal to percussion [No Abnormalities] : no abnormalities [Benign] : benign [Normal Gait] : normal gait [No Clubbing] : no clubbing [No Cyanosis] : no cyanosis [No Edema] : no edema [Normal Color/ Pigmentation] : normal color/ pigmentation [No Focal Deficits] : no focal deficits [Oriented x3] : oriented x3 [Normal Mood] : normal mood [Recent Memory Normal] : recent memory normal [Normal Affect] : normal affect [TextBox_68] : Bilateral Velcro crackles

## 2022-06-20 NOTE — REASON FOR VISIT
[Consultation] : a consultation [TextBox_44] :  interventional pulmonology consultation for flexible bronchoscopy guided lung biopsy [TextBox_13] : Dr. Daniel Garzon

## 2022-07-13 ENCOUNTER — TRANSCRIPTION ENCOUNTER (OUTPATIENT)
Age: 67
End: 2022-07-13

## 2022-07-13 ENCOUNTER — OUTPATIENT (OUTPATIENT)
Dept: OUTPATIENT SERVICES | Facility: HOSPITAL | Age: 67
LOS: 1 days | End: 2022-07-13

## 2022-07-13 VITALS
RESPIRATION RATE: 15 BRPM | WEIGHT: 156.97 LBS | SYSTOLIC BLOOD PRESSURE: 120 MMHG | HEART RATE: 74 BPM | TEMPERATURE: 98 F | DIASTOLIC BLOOD PRESSURE: 70 MMHG | HEIGHT: 65 IN | OXYGEN SATURATION: 99 %

## 2022-07-13 DIAGNOSIS — I10 ESSENTIAL (PRIMARY) HYPERTENSION: ICD-10-CM

## 2022-07-13 DIAGNOSIS — Z98.890 OTHER SPECIFIED POSTPROCEDURAL STATES: Chronic | ICD-10-CM

## 2022-07-13 DIAGNOSIS — R59.0 LOCALIZED ENLARGED LYMPH NODES: ICD-10-CM

## 2022-07-13 DIAGNOSIS — Z87.09 PERSONAL HISTORY OF OTHER DISEASES OF THE RESPIRATORY SYSTEM: ICD-10-CM

## 2022-07-13 DIAGNOSIS — Z90.49 ACQUIRED ABSENCE OF OTHER SPECIFIED PARTS OF DIGESTIVE TRACT: Chronic | ICD-10-CM

## 2022-07-13 DIAGNOSIS — J84.89 OTHER SPECIFIED INTERSTITIAL PULMONARY DISEASES: ICD-10-CM

## 2022-07-13 LAB
ANION GAP SERPL CALC-SCNC: 14 MMOL/L — SIGNIFICANT CHANGE UP (ref 7–14)
BUN SERPL-MCNC: 23 MG/DL — SIGNIFICANT CHANGE UP (ref 7–23)
CALCIUM SERPL-MCNC: 9.7 MG/DL — SIGNIFICANT CHANGE UP (ref 8.4–10.5)
CHLORIDE SERPL-SCNC: 102 MMOL/L — SIGNIFICANT CHANGE UP (ref 98–107)
CO2 SERPL-SCNC: 24 MMOL/L — SIGNIFICANT CHANGE UP (ref 22–31)
CREAT SERPL-MCNC: 0.87 MG/DL — SIGNIFICANT CHANGE UP (ref 0.5–1.3)
EGFR: 96 ML/MIN/1.73M2 — SIGNIFICANT CHANGE UP
GLUCOSE SERPL-MCNC: 100 MG/DL — HIGH (ref 70–99)
HCT VFR BLD CALC: 41.3 % — SIGNIFICANT CHANGE UP (ref 39–50)
HGB BLD-MCNC: 13.5 G/DL — SIGNIFICANT CHANGE UP (ref 13–17)
MCHC RBC-ENTMCNC: 28.2 PG — SIGNIFICANT CHANGE UP (ref 27–34)
MCHC RBC-ENTMCNC: 32.7 GM/DL — SIGNIFICANT CHANGE UP (ref 32–36)
MCV RBC AUTO: 86.4 FL — SIGNIFICANT CHANGE UP (ref 80–100)
NRBC # BLD: 0 /100 WBCS — SIGNIFICANT CHANGE UP
NRBC # FLD: 0 K/UL — SIGNIFICANT CHANGE UP
PLATELET # BLD AUTO: 256 K/UL — SIGNIFICANT CHANGE UP (ref 150–400)
POTASSIUM SERPL-MCNC: 4.3 MMOL/L — SIGNIFICANT CHANGE UP (ref 3.5–5.3)
POTASSIUM SERPL-SCNC: 4.3 MMOL/L — SIGNIFICANT CHANGE UP (ref 3.5–5.3)
RBC # BLD: 4.78 M/UL — SIGNIFICANT CHANGE UP (ref 4.2–5.8)
RBC # FLD: 12.6 % — SIGNIFICANT CHANGE UP (ref 10.3–14.5)
SODIUM SERPL-SCNC: 140 MMOL/L — SIGNIFICANT CHANGE UP (ref 135–145)
WBC # BLD: 6.58 K/UL — SIGNIFICANT CHANGE UP (ref 3.8–10.5)
WBC # FLD AUTO: 6.58 K/UL — SIGNIFICANT CHANGE UP (ref 3.8–10.5)

## 2022-07-13 RX ORDER — SODIUM CHLORIDE 9 MG/ML
1000 INJECTION, SOLUTION INTRAVENOUS
Refills: 0 | Status: DISCONTINUED | OUTPATIENT
Start: 2022-07-20 | End: 2022-08-03

## 2022-07-13 NOTE — H&P PST ADULT - PROBLEM SELECTOR PLAN 1
Patient is tentatively scheduled for surgery- Flexible endobronchial ultrasound bronchoscopy, transbronchial biopsy with bronchioalveolar lavage with cytology with Dr Falcon on 07/20/2022.    Pepcid provided for GI prophylaxis.   Pre-op instructions provided. Pt given verbal and written instructions with teach back on pepcid. Pt verbalized understanding with return demonstration.     Routine Covid PCR test ordered .Instructions regarding covid PCR test and locations for covid testing site provided. Pt verbalized understanding  KWADWO precautions

## 2022-07-13 NOTE — H&P PST ADULT - ASSESSMENT
pre op diagnosis of ILD, for pre surgical evaluation prior to scheduled surgery- Flexible endobronchial ultrasound bronchoscopy, transbronchial biopsy with bronchioalveolar lavage with cytology with Dr Falcon.

## 2022-07-13 NOTE — H&P PST ADULT - RESPIRATORY
no wheezes/no rales/no rhonchi/respirations non-labored/rales no wheezes/no rhonchi/respirations non-labored/rales

## 2022-07-13 NOTE — H&P PST ADULT - ATTENDING COMMENTS
Patient here for flexible bronchoscopy, lung biopsy, ebus and lymph node biopsy, envisia testing. Risks including but not limited to pneumothorax and bleeding discussed, and possible interventions. Agreeable to proceed.

## 2022-07-13 NOTE — H&P PST ADULT - NSANTHOSAYNRD_GEN_A_CORE
No. KWADWO screening performed.  STOP BANG Legend: 0-2 = LOW Risk; 3-4 = INTERMEDIATE Risk; 5-8 = HIGH Risk

## 2022-07-13 NOTE — H&P PST ADULT - OTHER CARE PROVIDERS
cardiologist- Dr Paulette Rock-   Pulmonologist Dr Garzon cardiologist- Dr Paulette Rock- [phone]  Pulmonologist Dr Garzon

## 2022-07-13 NOTE — H&P PST ADULT - PROBLEM SELECTOR PLAN 2
Patient instructed to take lisinopril with a sip of water on the morning of procedure.     EKG and ECHO in chart

## 2022-07-13 NOTE — H&P PST ADULT - HISTORY OF PRESENT ILLNESS
66 year old male with PMH of HTN, HLD, presents to [facility], with pre op diagnosis of ILD, for pre surgical evaluation prior to scheduled surgery- Flexible endobronchial ultrasound bronchoscopy, transbronchial biopsy with bronchioalveolar lavage with cytology with Dr Falcon.

## 2022-07-13 NOTE — H&P PST ADULT - NSICDXFAMILYHX_GEN_ALL_CORE_FT
FAMILY HISTORY:  Sibling  Still living? Unknown  Family hx of lung cancer, Age at diagnosis: Age Unknown

## 2022-07-19 ENCOUNTER — TRANSCRIPTION ENCOUNTER (OUTPATIENT)
Age: 67
End: 2022-07-19

## 2022-07-20 ENCOUNTER — TRANSCRIPTION ENCOUNTER (OUTPATIENT)
Age: 67
End: 2022-07-20

## 2022-07-20 ENCOUNTER — RESULT REVIEW (OUTPATIENT)
Age: 67
End: 2022-07-20

## 2022-07-20 ENCOUNTER — OUTPATIENT (OUTPATIENT)
Dept: OUTPATIENT SERVICES | Facility: HOSPITAL | Age: 67
LOS: 1 days | Discharge: HOME | End: 2022-07-20

## 2022-07-20 ENCOUNTER — APPOINTMENT (OUTPATIENT)
Dept: PULMONOLOGY | Facility: HOSPITAL | Age: 67
End: 2022-07-20

## 2022-07-20 VITALS
SYSTOLIC BLOOD PRESSURE: 129 MMHG | OXYGEN SATURATION: 98 % | WEIGHT: 156.97 LBS | DIASTOLIC BLOOD PRESSURE: 82 MMHG | RESPIRATION RATE: 14 BRPM | HEART RATE: 70 BPM | HEIGHT: 65 IN | TEMPERATURE: 99 F

## 2022-07-20 VITALS
RESPIRATION RATE: 19 BRPM | DIASTOLIC BLOOD PRESSURE: 61 MMHG | OXYGEN SATURATION: 99 % | TEMPERATURE: 98 F | SYSTOLIC BLOOD PRESSURE: 116 MMHG | HEART RATE: 82 BPM

## 2022-07-20 DIAGNOSIS — Z98.890 OTHER SPECIFIED POSTPROCEDURAL STATES: Chronic | ICD-10-CM

## 2022-07-20 DIAGNOSIS — R59.0 LOCALIZED ENLARGED LYMPH NODES: ICD-10-CM

## 2022-07-20 DIAGNOSIS — Z90.49 ACQUIRED ABSENCE OF OTHER SPECIFIED PARTS OF DIGESTIVE TRACT: Chronic | ICD-10-CM

## 2022-07-20 LAB
B PERT IGG+IGM PNL SER: CLEAR — SIGNIFICANT CHANGE UP
COLOR FLD: COLORLESS — SIGNIFICANT CHANGE UP
EOSINOPHIL # FLD: 0 % — SIGNIFICANT CHANGE UP
FLUID INTAKE SUBSTANCE CLASS: SIGNIFICANT CHANGE UP
FOLATE+VIT B12 SERBLD-IMP: 0 % — SIGNIFICANT CHANGE UP
LYMPHOCYTES # FLD: 25 % — SIGNIFICANT CHANGE UP
MESOTHL CELL # FLD: 0 % — SIGNIFICANT CHANGE UP
MONOS+MACROS # FLD: 27 % — SIGNIFICANT CHANGE UP
NEUTROPHILS-BODY FLUID: 20 % — SIGNIFICANT CHANGE UP
OTHER CELLS FLD MANUAL: 28 % — SIGNIFICANT CHANGE UP
RCV VOL RI: SIGNIFICANT CHANGE UP CELLS/UL (ref 0–5)
SPECIMEN SOURCE FLD: SIGNIFICANT CHANGE UP
TOTAL CELLS COUNTED, BODY FLUID: 100 CELLS — SIGNIFICANT CHANGE UP
TOTAL NUCLEATED CELL COUNT, BODY FLUID: SIGNIFICANT CHANGE UP CELLS/UL (ref 0–5)
TUBE TYPE: SIGNIFICANT CHANGE UP

## 2022-07-20 PROCEDURE — 88189 FLOWCYTOMETRY/READ 16 & >: CPT

## 2022-07-20 PROCEDURE — 88112 CYTOPATH CELL ENHANCE TECH: CPT | Mod: 26,59

## 2022-07-20 PROCEDURE — 88305 TISSUE EXAM BY PATHOLOGIST: CPT | Mod: 26

## 2022-07-20 PROCEDURE — 31652 BRONCH EBUS SAMPLNG 1/2 NODE: CPT | Mod: GC

## 2022-07-20 PROCEDURE — 88305 TISSUE EXAM BY PATHOLOGIST: CPT | Mod: 26,59

## 2022-07-20 PROCEDURE — 71045 X-RAY EXAM CHEST 1 VIEW: CPT | Mod: 26

## 2022-07-20 PROCEDURE — 31645 BRNCHSC W/THER ASPIR 1ST: CPT | Mod: GC

## 2022-07-20 PROCEDURE — 31632 BRONCHOSCOPY/LUNG BX ADDL: CPT | Mod: GC

## 2022-07-20 PROCEDURE — 88173 CYTOPATH EVAL FNA REPORT: CPT | Mod: 26

## 2022-07-20 PROCEDURE — 88108 CYTOPATH CONCENTRATE TECH: CPT | Mod: 26,59

## 2022-07-20 PROCEDURE — 31624 DX BRONCHOSCOPE/LAVAGE: CPT | Mod: GC

## 2022-07-20 PROCEDURE — 31628 BRONCHOSCOPY/LUNG BX EACH: CPT | Mod: GC

## 2022-07-20 RX ORDER — ROSUVASTATIN CALCIUM 5 MG/1
1 TABLET ORAL
Qty: 0 | Refills: 0 | DISCHARGE

## 2022-07-20 RX ORDER — CHOLECALCIFEROL (VITAMIN D3) 125 MCG
1 CAPSULE ORAL
Qty: 0 | Refills: 0 | DISCHARGE

## 2022-07-20 RX ORDER — LISINOPRIL 2.5 MG/1
1 TABLET ORAL
Qty: 0 | Refills: 0 | DISCHARGE

## 2022-07-20 NOTE — ASU DISCHARGE PLAN (ADULT/PEDIATRIC) - NS MD DC FALL RISK RISK
For information on Fall & Injury Prevention, visit: [email or web address] OR  [email or web address] OR  https://www.cdc.gov/steadi/patient.html

## 2022-07-20 NOTE — BRIEF OPERATIVE NOTE - OPERATION/FINDINGS
Airway inspection revealed minimal secretions and no endobronchial lesions.  Bronchoalveolar lavage performed of right middle lobe.  Endobronchial ultrasound then performed with biopsy of station 11L and 7 lymph nodes.  Afterwards, patient underwent transbronchial forceps biopsy of right upper and right lower lobe.  Therapeutic aspiration of secretions performed prior to withdrawal of bronchoscope. Airway inspection revealed minimal secretions and no endobronchial lesions.  Bronchoalveolar lavage performed of right middle lobe.  Endobronchial ultrasound then performed with biopsy of station 11L and 7 lymph nodes.  Afterwards, patient underwent transbronchial forceps biopsy of right upper and right lower lobe for surgical pathology and Envisia testing.  Therapeutic aspiration of secretions performed prior to withdrawal of bronchoscope.

## 2022-07-20 NOTE — ASU DISCHARGE PLAN (ADULT/PEDIATRIC) - PROCEDURE
flexible bronchoscopy, bronchoalveolar lavage, endobronchial ultrasound, lymph node biopsy, transbronchial lung biopsy

## 2022-07-20 NOTE — BRIEF OPERATIVE NOTE - NSICDXBRIEFPROCEDURE_GEN_ALL_CORE_FT
PROCEDURES:  Transbronchial biopsy of lung using forceps 20-Jul-2022 15:09:35  Rommel Torrez  Bronchoscopy with bronchoalveolar lavage 20-Jul-2022 15:09:48  Rommel Torrez  Bronchoscopy with endobronchial ultrasound and sampling of 1 or 2 lymph nodes 20-Jul-2022 15:17:02  Rommel Torrez

## 2022-07-20 NOTE — ASU PATIENT PROFILE, ADULT - FALL HARM RISK - UNIVERSAL INTERVENTIONS
Bed in lowest position, wheels locked, appropriate side rails in place/Call bell, personal items and telephone in reach/Instruct patient to call for assistance before getting out of bed or chair/Non-slip footwear when patient is out of bed/[place] to call system/Physically safe environment - no spills, clutter or unnecessary equipment/Purposeful Proactive Rounding/Room/bathroom lighting operational, light cord in reach

## 2022-07-20 NOTE — ASU PREOP CHECKLIST - HAND OFF
Patient is requesting a phone call from Sachi Ramey, or Juliane regarding a prior auth request for his medication. The insurance company stated that they have faxed a request to our clinic and are waiting for the reply   Holding RN to OR RN

## 2022-07-20 NOTE — ASU PATIENT PROFILE, ADULT - MEDICATION ADMINISTRATION INFO, PROFILE
Received Choice form at [phone]  Agency/Facility Name: [facility]   Referral sent per Choice form @ [phone]        no concerns

## 2022-07-20 NOTE — ASU PATIENT PROFILE, ADULT - BRAND OF COVID-19 VACCINATION
Message left reminding patient that today will be the last day to take ASA until after the procedure scheduled next week.  Asked that he call clinic to confirm message.    Pfizer dose 1 and 2

## 2022-07-21 ENCOUNTER — NON-APPOINTMENT (OUTPATIENT)
Age: 67
End: 2022-07-21

## 2022-07-21 PROBLEM — Z87.09 PERSONAL HISTORY OF OTHER DISEASES OF THE RESPIRATORY SYSTEM: Chronic | Status: ACTIVE | Noted: 2022-07-13

## 2022-07-21 PROBLEM — I10 ESSENTIAL (PRIMARY) HYPERTENSION: Chronic | Status: ACTIVE | Noted: 2022-07-13

## 2022-07-21 PROBLEM — E78.5 HYPERLIPIDEMIA, UNSPECIFIED: Chronic | Status: ACTIVE | Noted: 2022-07-13

## 2022-07-21 LAB
GRAM STN FLD: SIGNIFICANT CHANGE UP
NIGHT BLUE STAIN TISS: SIGNIFICANT CHANGE UP
SPECIMEN SOURCE: SIGNIFICANT CHANGE UP
SPECIMEN SOURCE: SIGNIFICANT CHANGE UP
TM INTERPRETATION: SIGNIFICANT CHANGE UP
TM INTERPRETATION: SIGNIFICANT CHANGE UP

## 2022-07-22 LAB — NON-GYNECOLOGICAL CYTOLOGY STUDY: SIGNIFICANT CHANGE UP

## 2022-07-23 LAB
CULTURE RESULTS: SIGNIFICANT CHANGE UP
SPECIMEN SOURCE: SIGNIFICANT CHANGE UP

## 2022-07-27 LAB — SURGICAL PATHOLOGY STUDY: SIGNIFICANT CHANGE UP

## 2022-08-08 ENCOUNTER — APPOINTMENT (OUTPATIENT)
Dept: PULMONOLOGY | Facility: CLINIC | Age: 67
End: 2022-08-08

## 2022-08-08 DIAGNOSIS — U07.1 COVID-19: ICD-10-CM

## 2022-08-08 PROCEDURE — 99443: CPT

## 2022-08-09 ENCOUNTER — LABORATORY RESULT (OUTPATIENT)
Age: 67
End: 2022-08-09

## 2022-08-11 ENCOUNTER — APPOINTMENT (OUTPATIENT)
Dept: PULMONOLOGY | Facility: CLINIC | Age: 67
End: 2022-08-11

## 2022-08-11 DIAGNOSIS — I10 ESSENTIAL (PRIMARY) HYPERTENSION: ICD-10-CM

## 2022-08-11 PROCEDURE — 99443: CPT

## 2022-08-11 NOTE — DISCUSSION/SUMMARY
[FreeTextEntry1] : ---Assessment plan----------The patient has been referred here for further opinion regarding pulmonary problem,\par - 66-year-old male history of interstitial lung disease ------------ no history of any collagen vascular disorder--- CT suggestive of UIP pattern  ---[ BROTEHR  HAS  IPF]----\par \par 1 ILD ----S/P r bronchoscopy and EVISIA STUDY------ ??NSIP \par 2   PFT  before next office visit\par 3 QUANTGOLD  positive---will consider Rifapentine and INH weekly regimen for 3 months pending lung CT results\par 4- labs drawn in our office today \par 5- COVID  8/7/2022-----PREDNISONE, PAXLOVID-----FEELS  BETTER  ON PAXLOVID----CONTD LOW DOSE PREDNISONE----  \par 6- WILL F//U IN OFFICE IN 2-3 WEEKS TIME\par \par Thanks for allowing  me to participate  in the care of this patient.  Patient at this time  will follow  the above mentioned recommendations and return back for follow up visit. If you have any questions  I can be reached  at #[phone] (beeper)  or  [phone] (office).\par \par Daniel Garzon MD, FCCP \par Director, Pulmonary Hypertension Program \par [facility] \par Division of Pulmonary, Critical Care and Sleep Medicine \par  Professor of Medicine \par [facility] [employer]\par \par MADDIE Olsen\par \par

## 2022-08-11 NOTE — HISTORY OF PRESENT ILLNESS
[TextBox_4] : This letter  is regarding your patient  who  attended pulmonary out patient office today.  I have reviewed  patient's  past history, social history, family history and medication list. I also  reviewed nurse practitioners/ and fellows  notes and assessment and agree with it.  \par The patient was referred by Dr.ALEX HODGE------- 66-year-old male history of interstitial lung disease ------------ no history of any collagen vascular disorder------- no family history of interstitial lung disease------ BROTHER HAS LUNG TRASNPLANT [CAUSE PT NOT SURE] -----\par \par ------No history of , fever, chills , rigors, chest pain, or hemoptysis. Questionable history of Raynaud's phenomenon. No h/o significant weight loss in last few months. No history of liver dysfunction , collagen vascular disorder or chronic thromboembolic disease. I would classify the patient's dyspnea as WHO  FUNCTIONAL CLASS II--------\par \par ----Echo  date------ 2021  ef 66,pasp44\par \par ---stress echo 3/2022 \par Conclusions: \par 1. Exercise capacity is 8 METS, which is fair for age and\par gender.\par 2. Normal hemodynamic response.\par 3. Normal electrocardiographic response.\par 4. Normal augmentation in left ventricular systolic\par function.\par 5. Appropriate heart rate response.\par 6. Appropriate blood pressure response.\par 7. Normal stress echocardiogram with no evidence of\par inducible ischemia.  Baseline images demonstrated mild\par pulmonary hypertension (estimated pulmonary artery systolic\par pressure 41 mmHg).  The estimated pulmonary artery systolic\par pressure did not change significantly following peak\par exercise.\par \par ----Pft date--------- May 2021-----borderline restrictive lung defect is \par ----Ct scan date------- 2021----ZWANGER PERISI-----mild interstitial lung disease at the bases with regions of focal honeycombing -----6MWT  525 METERS NO  DESATURATION----------------\par ---\par PATHOLOGY  EVISA STUDY- 2022--- NEGATIVE FOR UIP \par  TB BIOPSY ---PATHOLOGY  2022--   -- MILD NON SOPEIFIC chronic inflammation\par ct chest 8/2021 \par IMPRESSION:\par Interstitial lung disease marked by peripheral reticulation, groundglass opacity and traction bronchiectasis with a lower lobe predominance. No honeycombing.\par \par ct chest 4/2022\par IMPRESSION:.\par Findings of pulmonary fibrosis unchanged compared to 8/12/2021\par \par hst did not show KWADWO\par \par 10/2021-- ILD--no resp complaints -NNEDS CPET ---NO KWADWO ON PSEG--------WILL TRY SA SHORT  COURSE OF STEROIDS  ---QUNAT GOLD POSITIVE\par \par 11/2021----ILD--no resp complaints felt better on steroids----   we will continue on low-dose 7.5 mg p.o. daily-----NEEDS CPET ---NO KWADWO ON PSEG-------  ---QUNAT GOLD POSITIVE\par \par 1/2022 ILD-- no resp complaints, continues on pred 7.5mg qd, CPET and 6 minute walk testing reviewed with patient both results wnl\par \par 6/2022 ILD--off pred, no resp complaints --- refer to Dr. Leon agreeable for bronchoscopy and EVISIA STUDY-----\par \par \par \par AUGUST 2022--  DEVELOPED COVID ---AFEBRILE, COUGH  START PAXLOVID  --HOME LABS \par \par \par AUGUST 11 2022-------FEELS  BETTER  ON PAXLOVID----CONTD LOW DOSE PREDNISONE-----SAT ,  AFEBRILE,  WILL F//U IN OFFICE IN 2-3 WEEKS TIME

## 2022-08-12 ENCOUNTER — APPOINTMENT (OUTPATIENT)
Dept: PULMONOLOGY | Facility: CLINIC | Age: 67
End: 2022-08-12

## 2022-08-19 LAB
CULTURE RESULTS: SIGNIFICANT CHANGE UP
SPECIMEN SOURCE: SIGNIFICANT CHANGE UP

## 2022-09-07 ENCOUNTER — APPOINTMENT (OUTPATIENT)
Dept: CARDIOLOGY | Facility: CLINIC | Age: 67
End: 2022-09-07

## 2022-09-07 ENCOUNTER — NON-APPOINTMENT (OUTPATIENT)
Age: 67
End: 2022-09-07

## 2022-09-07 VITALS
DIASTOLIC BLOOD PRESSURE: 69 MMHG | HEIGHT: 63 IN | BODY MASS INDEX: 28.17 KG/M2 | HEART RATE: 61 BPM | OXYGEN SATURATION: 97 % | WEIGHT: 159 LBS | SYSTOLIC BLOOD PRESSURE: 117 MMHG

## 2022-09-07 PROCEDURE — 93000 ELECTROCARDIOGRAM COMPLETE: CPT

## 2022-09-07 PROCEDURE — 99214 OFFICE O/P EST MOD 30 MIN: CPT

## 2022-09-07 NOTE — PHYSICAL EXAM
[Well Developed] : well developed [Well Nourished] : well nourished [No Acute Distress] : no acute distress [Normal Conjunctiva] : normal conjunctiva [Normal Venous Pressure] : normal venous pressure [No Carotid Bruit] : no carotid bruit [Normal S1, S2] : normal S1, S2 [No Murmur] : no murmur [No Rub] : no rub [No Gallop] : no gallop [Soft] : abdomen soft [Non Tender] : non-tender [No Masses/organomegaly] : no masses/organomegaly [Normal Bowel Sounds] : normal bowel sounds [Normal Gait] : normal gait [No Edema] : no edema [No Cyanosis] : no cyanosis [No Clubbing] : no clubbing [No Varicosities] : no varicosities [No Rash] : no rash [No Skin Lesions] : no skin lesions [Moves all extremities] : moves all extremities [No Focal Deficits] : no focal deficits [Normal Speech] : normal speech [Alert and Oriented] : alert and oriented [Normal memory] : normal memory [de-identified] : B/L  fine crackles at the bases.

## 2022-09-07 NOTE — DISCUSSION/SUMMARY
[FreeTextEntry1] : PHT: At this time I am deferring management for his pulmonary hypertension to the primary team. patient reports mininal symptoms walking 2 flights of stairs\par \par HTN: Blood pressure is much better controlled with combination of amlodipine and lisinopril, he will continue taking the current doses in addition to work on his diet lifestyle with salt restriction weight management and regular exercises and sleep hygiene.. \par \par Follow-up with us in 6 months [EKG obtained to assist in diagnosis and management of assessed problem(s)] : EKG obtained to assist in diagnosis and management of assessed problem(s)

## 2022-09-10 LAB
CULTURE RESULTS: SIGNIFICANT CHANGE UP
SPECIMEN SOURCE: SIGNIFICANT CHANGE UP

## 2022-09-16 ENCOUNTER — MED ADMIN CHARGE (OUTPATIENT)
Age: 67
End: 2022-09-16

## 2022-09-16 ENCOUNTER — APPOINTMENT (OUTPATIENT)
Dept: PULMONOLOGY | Facility: CLINIC | Age: 67
End: 2022-09-16

## 2022-09-16 VITALS
SYSTOLIC BLOOD PRESSURE: 120 MMHG | TEMPERATURE: 98 F | BODY MASS INDEX: 27.64 KG/M2 | HEIGHT: 63 IN | WEIGHT: 156 LBS | RESPIRATION RATE: 15 BRPM | HEART RATE: 77 BPM | DIASTOLIC BLOOD PRESSURE: 73 MMHG | OXYGEN SATURATION: 98 %

## 2022-09-16 DIAGNOSIS — R05.9 COUGH, UNSPECIFIED: ICD-10-CM

## 2022-09-16 DIAGNOSIS — R76.12 NONSPECIFIC REACTION TO CELL MEDIATED IMMUNITY MEASUREMENT OF GAMMA INTERFERON ANTIGEN RESPONSE W/OUT ACTIVE TUBERCULOSIS: ICD-10-CM

## 2022-09-16 PROCEDURE — 99215 OFFICE O/P EST HI 40 MIN: CPT

## 2022-09-16 RX ORDER — NIRMATRELVIR AND RITONAVIR 300-100 MG
20 X 150 MG & KIT ORAL
Qty: 30 | Refills: 0 | Status: DISCONTINUED | COMMUNITY
Start: 2022-08-08 | End: 2022-09-16

## 2022-09-16 NOTE — HISTORY OF PRESENT ILLNESS
[TextBox_4] : This letter  is regarding your patient  who  attended pulmonary out patient office today.  I have reviewed  patient's  past history, social history, family history and medication list. I also  reviewed nurse practitioners/ and fellows  notes and assessment and agree with it.  \par The patient was referred by Dr.ALEX HODGE------- 66-year-old male history of interstitial lung disease ------------ no history of any collagen vascular disorder------- no family history of interstitial lung disease------ BROTHER HAS LUNG TRASNPLANT [CAUSE PT NOT SURE] -----\par \par ------No history of , fever, chills , rigors, chest pain, or hemoptysis. Questionable history of Raynaud's phenomenon. No h/o significant weight loss in last few months. No history of liver dysfunction , collagen vascular disorder or chronic thromboembolic disease. I would classify the patient's dyspnea as WHO  FUNCTIONAL CLASS II--------\par \par ----Echo  date------ 2021  ef 66,pasp44\par \par ---stress echo 3/2022 \par Conclusions: \par 1. Exercise capacity is 8 METS, which is fair for age and\par gender.\par 2. Normal hemodynamic response.\par 3. Normal electrocardiographic response.\par 4. Normal augmentation in left ventricular systolic\par function.\par 5. Appropriate heart rate response.\par 6. Appropriate blood pressure response.\par 7. Normal stress echocardiogram with no evidence of\par inducible ischemia.  Baseline images demonstrated mild\par pulmonary hypertension (estimated pulmonary artery systolic\par pressure 41 mmHg).  The estimated pulmonary artery systolic\par pressure did not change significantly following peak\par exercise.\par \par ----Pft date--------- May 2021-----borderline restrictive lung defect is \par ----Ct scan date------- 2021----ZWANGER PERISI-----mild interstitial lung disease at the bases with regions of focal honeycombing -----6MWT  525 METERS NO  DESATURATION----------------\par ---\par PATHOLOGY  EVISA STUDY- 2022--- NEGATIVE FOR UIP \par  TB BIOPSY ---PATHOLOGY  2022--   -- MILD NON SOPEIFIC chronic inflammation\par ct chest 8/2021 \par IMPRESSION:\par Interstitial lung disease marked by peripheral reticulation, groundglass opacity and traction bronchiectasis with a lower lobe predominance. No honeycombing.\par \par ct chest 4/2022\par IMPRESSION:.\par Findings of pulmonary fibrosis unchanged compared to 8/12/2021\par \par hst did not show KWADWO\par \par 10/2021-- ILD--no resp complaints -NNEDS CPET ---NO KWADWO ON PSEG--------WILL TRY SA SHORT  COURSE OF STEROIDS  ---QUNAT GOLD POSITIVE\par \par 11/2021----ILD--no resp complaints felt better on steroids----   we will continue on low-dose 7.5 mg p.o. daily-----NEEDS CPET ---NO KWADWO ON PSEG-------  ---QUNAT GOLD POSITIVE\par \par 1/2022 ILD-- no resp complaints, continues on pred 7.5mg qd, CPET and 6 minute walk testing reviewed with patient both results wnl\par \par 6/2022 ILD--off pred, no resp complaints --- refer to Dr. Leon agreeable for bronchoscopy and EVISIA STUDY-----\par \par \par \par AUGUST 2022--  DEVELOPED COVID ---AFEBRILE, COUGH  START PAXLOVID  --HOME LABS \par \par \par AUGUST 11 2022-------FEELS  BETTER  ON PAXLOVID----CONTD LOW DOSE PREDNISONE-----SAT ,  AFEBRILE,  WILL F//U IN OFFICE IN 2-3 WEEKS TIME\par \par 9/2022 s/p covid last month- did well on paxlovid- no current resp complaints\par ILD s/p lung biopsy- envisia showed no UIP pattern\par Pt reports he feels well on pred 10mg- no resp complaints- exercises w/o problem\par UTD w/covid vac x 3

## 2022-09-16 NOTE — DISCUSSION/SUMMARY
[FreeTextEntry1] : ---Assessment plan----------The patient has been referred here for further opinion regarding pulmonary problem,\par - 66-year-old male history of interstitial lung disease ------------ no history of any collagen vascular disorder--- CT suggestive of UIP pattern  ---[ CLARKHR  HAS  IPF]----\par \par 1 ILD ---biopsy showed not UIP so antifibrotic therapy not needed, decrease pred 7.5mg then 5mg\par 2 QUANT GOLD  positive---will consider Rifapentine and INH weekly regimen for 3 months\par 3- needs dexa scan\par 4- s/p influenza vac in office today\par 5- f/u in 3 m\par \par Thanks for allowing  me to participate  in the care of this patient.  Patient at this time  will follow  the above mentioned recommendations and return back for follow up visit. If you have any questions  I can be reached  at #[phone] (beeper)  or  [phone] (office).\par \par KASH Olsen-C\par for\par Daniel Garzon MD, FCCP \par Director, Pulmonary Hypertension Program \par [facility] \par Division of Pulmonary, Critical Care and Sleep Medicine \par  Professor of Medicine \par [employer] [facility] [employer]\par \par \par \par

## 2022-10-04 ENCOUNTER — RX RENEWAL (OUTPATIENT)
Age: 67
End: 2022-10-04

## 2022-10-13 ENCOUNTER — RESULT REVIEW (OUTPATIENT)
Age: 67
End: 2022-10-13

## 2022-10-13 ENCOUNTER — APPOINTMENT (OUTPATIENT)
Dept: RADIOLOGY | Facility: CLINIC | Age: 67
End: 2022-10-13

## 2022-10-13 ENCOUNTER — OUTPATIENT (OUTPATIENT)
Dept: OUTPATIENT SERVICES | Facility: HOSPITAL | Age: 67
LOS: 1 days | End: 2022-10-13
Payer: COMMERCIAL

## 2022-10-13 DIAGNOSIS — Z00.00 ENCOUNTER FOR GENERAL ADULT MEDICAL EXAMINATION WITHOUT ABNORMAL FINDINGS: ICD-10-CM

## 2022-10-13 DIAGNOSIS — Z98.890 OTHER SPECIFIED POSTPROCEDURAL STATES: Chronic | ICD-10-CM

## 2022-10-13 DIAGNOSIS — Z90.49 ACQUIRED ABSENCE OF OTHER SPECIFIED PARTS OF DIGESTIVE TRACT: Chronic | ICD-10-CM

## 2022-10-13 PROCEDURE — 77080 DXA BONE DENSITY AXIAL: CPT

## 2022-10-13 PROCEDURE — 77080 DXA BONE DENSITY AXIAL: CPT | Mod: 26

## 2022-11-29 ENCOUNTER — RX RENEWAL (OUTPATIENT)
Age: 67
End: 2022-11-29

## 2022-12-08 ENCOUNTER — RX RENEWAL (OUTPATIENT)
Age: 67
End: 2022-12-08

## 2023-01-03 ENCOUNTER — APPOINTMENT (OUTPATIENT)
Dept: PULMONOLOGY | Facility: CLINIC | Age: 68
End: 2023-01-03
Payer: MEDICARE

## 2023-01-03 VITALS
OXYGEN SATURATION: 95 % | RESPIRATION RATE: 15 BRPM | HEART RATE: 84 BPM | SYSTOLIC BLOOD PRESSURE: 138 MMHG | HEIGHT: 63 IN | BODY MASS INDEX: 28.35 KG/M2 | TEMPERATURE: 98 F | WEIGHT: 160 LBS | DIASTOLIC BLOOD PRESSURE: 80 MMHG

## 2023-01-03 PROCEDURE — 94618 PULMONARY STRESS TESTING: CPT

## 2023-01-03 PROCEDURE — 99215 OFFICE O/P EST HI 40 MIN: CPT | Mod: 25

## 2023-01-03 PROCEDURE — ZZZZZ: CPT

## 2023-01-03 RX ORDER — PREDNISONE 10 MG/1
10 TABLET ORAL DAILY
Qty: 30 | Refills: 1 | Status: DISCONTINUED | COMMUNITY
Start: 2022-08-08 | End: 2023-01-03

## 2023-01-03 NOTE — HISTORY OF PRESENT ILLNESS
[Former] : former [TextBox_4] : This letter  is regarding your patient  who  attended pulmonary out patient office today.  I have reviewed  patient's  past history, social history, family history and medication list. I also  reviewed nurse practitioners/ and fellows  notes and assessment and agree with it.  \par The patient was referred by Dr.ALEX HODGE------- 67  year-old male history of interstitial lung disease ------------ no history of any collagen vascular disorder------- no family history of interstitial lung disease------ BROTHER HAS LUNG TRASNPLANT [CAUSE PT NOT SURE] -----\par \par ------No history of , fever, chills , rigors, chest pain, or hemoptysis. Questionable history of Raynaud's phenomenon. No h/o significant weight loss in last few months. No history of liver dysfunction , collagen vascular disorder or chronic thromboembolic disease. I would classify the patient's dyspnea as WHO  FUNCTIONAL CLASS II--------\par \par \par PFT 2022-----MILD restriction decreased DLCO\par 6MWT  2021------523 METERS------\par CPET 2021  -----VO2  21 ML/KG/MIN  \par ----Echo  date------ 2021  ef 65,pasp44\par \par ---stress echo 3/2022 \par Conclusions: \par 1. Exercise capacity is 8 METS, which is fair for age and\par gender.\par 2. Normal hemodynamic response.\par 3. Normal electrocardiographic response.\par 4. Normal augmentation in left ventricular systolic\par function.\par 5. Appropriate heart rate response.\par 6. Appropriate blood pressure response.\par 7. Normal stress echocardiogram with no evidence of\par inducible ischemia.  Baseline images demonstrated mild\par pulmonary hypertension (estimated pulmonary artery systolic\par pressure 41 mmHg).  The estimated pulmonary artery systolic\par pressure did not change significantly following peak\par exercise.\par \par ----Pft date--------- May 2021-----borderline restrictive lung defect is \par ----Ct scan date------- 2021----MASON PERISI-----mild interstitial lung disease at the bases with regions of focal honeycombing -----6MWT  525 METERS NO  DESATURATION----------------\par ---\par PATHOLOGY  EVISA STUDY- 2022--- NEGATIVE FOR UIP \par  TB BIOPSY ---PATHOLOGY  2022--   -- MILD NON SOPEIFIC chronic inflammation\par ct chest 8/2021 \par IMPRESSION:\par Interstitial lung disease marked by peripheral reticulation, groundglass opacity and traction bronchiectasis with a lower lobe predominance. No honeycombing.\par \par ct chest 4/2022\par IMPRESSION:.\par Findings of pulmonary fibrosis unchanged compared to 8/12/2021\par \par hst did not show KWADWO\par \par 10/2021-- ILD--no resp complaints -NNEDS CPET ---NO KWADWO ON PSEG--------WILL TRY SA SHORT  COURSE OF STEROIDS  ---QUNAT GOLD POSITIVE\par \par 11/2021----ILD--no resp complaints felt better on steroids----   we will continue on low-dose 7.5 mg p.o. daily-----NEEDS CPET ---NO KWADWO ON PSEG-------  ---QUNAT GOLD POSITIVE\par \par 1/2022 ILD-- no resp complaints, continues on pred 7.5mg qd, CPET and 6 minute walk testing reviewed with patient both results wnl\par \par 6/2022 ILD--off pred, no resp complaints --- refer to Dr. Leon agreeable for bronchoscopy and EVISIA STUDY-----\par \par \par \par AUGUST 2022--  DEVELOPED COVID ---AFEBRILE, COUGH  START PAXLOVID  --HOME LABS \par \par \par AUGUST 11 2022-------FEELS  BETTER  ON PAXLOVID----CONTD LOW DOSE PREDNISONE-----SAT ,  AFEBRILE,  WILL F//U IN OFFICE IN 2-3 WEEKS TIME\par \par 1/2023 ILD- doing well on pred 10mg RECOVERED FROM COVID---DID NOT DO THAT WELL ON 6MWT ON VICKI 3 2023\par ---WILL REPEAT CT CHEST  [TextBox_100] : 8/2021 [TextBox_108] : 3.1

## 2023-01-03 NOTE — DISCUSSION/SUMMARY
[FreeTextEntry1] : ---Assessment plan----------The patient has been referred here for further opinion regarding pulmonary problem,\par - 67-year-old male history of interstitial lung disease ------------ no history of any collagen vascular disorder--- CT suggestive of UIP pattern  ---[ BROTEHR  HAS  IPF]----\par \par 1 ILD ----S/P r bronchoscopy IN 2022 TBB  ?POSSIBLE NSIP PATTERN  -- and EVISIA STUDY  NEGATIVE  ------ ??NSIP ---REEAT CT CHEST \par 2   PFT  before next office visit\par 3 QUANTGOLD  positive---will consider Rifapentine and INH weekly regimen for 3 months pending lung CT results\par 4- COVID  8/7/2022-----RECOVERED --- NEEDS CT CHEST      \par 5- f/u in 3-4m---\par \par Thanks for allowing  me to participate  in the care of this patient.  Patient at this time  will follow  the above mentioned recommendations and return back for follow up visit. If you have any questions  I can be reached  at #[phone] (beeper)  or  [phone] (office).\par \par Daniel Garzon MD, FCCP \par Director, Pulmonary Hypertension Program \par [facility] \par Division of Pulmonary, Critical Care and Sleep Medicine \par  Professor of Medicine \par [employer]\par \par MADDIE Olsen\par \par

## 2023-02-04 ENCOUNTER — OUTPATIENT (OUTPATIENT)
Dept: OUTPATIENT SERVICES | Facility: HOSPITAL | Age: 68
LOS: 1 days | End: 2023-02-04
Payer: COMMERCIAL

## 2023-02-04 ENCOUNTER — APPOINTMENT (OUTPATIENT)
Dept: CT IMAGING | Facility: CLINIC | Age: 68
End: 2023-02-04
Payer: MEDICARE

## 2023-02-04 DIAGNOSIS — Z98.890 OTHER SPECIFIED POSTPROCEDURAL STATES: Chronic | ICD-10-CM

## 2023-02-04 DIAGNOSIS — Z90.49 ACQUIRED ABSENCE OF OTHER SPECIFIED PARTS OF DIGESTIVE TRACT: Chronic | ICD-10-CM

## 2023-02-04 DIAGNOSIS — J84.9 INTERSTITIAL PULMONARY DISEASE, UNSPECIFIED: ICD-10-CM

## 2023-02-04 PROCEDURE — 71250 CT THORAX DX C-: CPT | Mod: 26

## 2023-02-04 PROCEDURE — 71250 CT THORAX DX C-: CPT

## 2023-02-07 ENCOUNTER — RX RENEWAL (OUTPATIENT)
Age: 68
End: 2023-02-07

## 2023-03-08 ENCOUNTER — APPOINTMENT (OUTPATIENT)
Dept: CARDIOLOGY | Facility: CLINIC | Age: 68
End: 2023-03-08
Payer: MEDICARE

## 2023-03-08 ENCOUNTER — NON-APPOINTMENT (OUTPATIENT)
Age: 68
End: 2023-03-08

## 2023-03-08 VITALS
HEART RATE: 71 BPM | BODY MASS INDEX: 28.87 KG/M2 | DIASTOLIC BLOOD PRESSURE: 79 MMHG | SYSTOLIC BLOOD PRESSURE: 134 MMHG | HEIGHT: 63 IN | OXYGEN SATURATION: 97 %

## 2023-03-08 VITALS — BODY MASS INDEX: 28.87 KG/M2 | WEIGHT: 163 LBS

## 2023-03-08 DIAGNOSIS — E78.5 HYPERLIPIDEMIA, UNSPECIFIED: ICD-10-CM

## 2023-03-08 PROCEDURE — 99214 OFFICE O/P EST MOD 30 MIN: CPT

## 2023-03-08 PROCEDURE — 93000 ELECTROCARDIOGRAM COMPLETE: CPT

## 2023-03-08 NOTE — PHYSICAL EXAM
[Well Developed] : well developed [Well Nourished] : well nourished [No Acute Distress] : no acute distress [Normal Conjunctiva] : normal conjunctiva [Normal Venous Pressure] : normal venous pressure [No Carotid Bruit] : no carotid bruit [Normal S1, S2] : normal S1, S2 [No Murmur] : no murmur [No Rub] : no rub [No Gallop] : no gallop [Soft] : abdomen soft [Non Tender] : non-tender [No Masses/organomegaly] : no masses/organomegaly [Normal Bowel Sounds] : normal bowel sounds [Normal Gait] : normal gait [No Edema] : no edema [No Cyanosis] : no cyanosis [No Clubbing] : no clubbing [No Varicosities] : no varicosities [No Rash] : no rash [No Skin Lesions] : no skin lesions [Moves all extremities] : moves all extremities [No Focal Deficits] : no focal deficits [Normal Speech] : normal speech [Alert and Oriented] : alert and oriented [Normal memory] : normal memory [de-identified] : B/L  fine crackles at the bases.

## 2023-03-08 NOTE — DISCUSSION/SUMMARY
[FreeTextEntry1] : PHT: At this time I am deferring management for his pulmonary hypertension to the primary team. patient reports mininal symptoms walking 2 flights of stairs and upto 15K steps a day \par \par HTN: Blood pressure is much better controlled with combination of amlodipine and lisinopril, he will continue taking the current doses in addition to work on his diet lifestyle with salt restriction weight management and regular exercises and sleep hygiene.\par \par \par will request lipid profile from PCP\par \par Follow-up with us in 6 months [EKG obtained to assist in diagnosis and management of assessed problem(s)] : EKG obtained to assist in diagnosis and management of assessed problem(s)

## 2023-03-08 NOTE — HISTORY OF PRESENT ILLNESS
[FreeTextEntry1] : 66 yr M with HTN , IPF with no known CV ds. He is here for CV screening. \par \par Patient denies chest pain, dyspnea, orthopnea, PND, edema, palpitations, syncope or presyncope. \par Patient is compliant with their medications.\par walks 15 K steps. with no symptoms.

## 2023-04-03 ENCOUNTER — APPOINTMENT (OUTPATIENT)
Dept: PULMONOLOGY | Facility: CLINIC | Age: 68
End: 2023-04-03
Payer: MEDICARE

## 2023-04-03 VITALS
WEIGHT: 164 LBS | HEART RATE: 64 BPM | RESPIRATION RATE: 15 BRPM | HEIGHT: 63 IN | SYSTOLIC BLOOD PRESSURE: 134 MMHG | DIASTOLIC BLOOD PRESSURE: 69 MMHG | BODY MASS INDEX: 29.06 KG/M2 | OXYGEN SATURATION: 97 % | TEMPERATURE: 97.3 F

## 2023-04-03 PROCEDURE — 99215 OFFICE O/P EST HI 40 MIN: CPT

## 2023-04-03 NOTE — HISTORY OF PRESENT ILLNESS
[Former] : former [TextBox_4] : This letter  is regarding your patient  who  attended pulmonary out patient office today.  I have reviewed  patient's  past history, social history, family history and medication list. I also  reviewed nurse practitioners/ and fellows  notes and assessment and agree with it.  \par The patient was referred by Dr.ALEX HODGE------- 67  year-old male history of interstitial lung disease ------------ no history of any collagen vascular disorder------- no family history of interstitial lung disease------ BROTHER HAS LUNG TRASNPLANT [CAUSE PT NOT SURE] -----\par \par ------No history of , fever, chills , rigors, chest pain, or hemoptysis. Questionable history of Raynaud's phenomenon. No h/o significant weight loss in last few months. No history of liver dysfunction , collagen vascular disorder or chronic thromboembolic disease. I would classify the patient's dyspnea as WHO  FUNCTIONAL CLASS II--------\par \par \par PFT 2022-----MILD restriction decreased DLCO\par 6MWT  2021------523 METERS------\par CPET 2021  -----VO2  21 ML/KG/MIN  \par ----Echo  date------ 2021  ef 65,pasp44\par \par ---stress echo 3/2022 \par Conclusions: \par 1. Exercise capacity is 8 METS, which is fair for age and\par gender.\par 2. Normal hemodynamic response.\par 3. Normal electrocardiographic response.\par 4. Normal augmentation in left ventricular systolic\par function.\par 5. Appropriate heart rate response.\par 6. Appropriate blood pressure response.\par 7. Normal stress echocardiogram with no evidence of\par inducible ischemia.  Baseline images demonstrated mild\par pulmonary hypertension (estimated pulmonary artery systolic\par pressure 41 mmHg).  The estimated pulmonary artery systolic\par pressure did not change significantly following peak\par exercise.\par \par ----Pft date--------- May 2021-----borderline restrictive lung defect is \par ----Ct scan date------- 2021----MASON PERISI-----mild interstitial lung disease at the bases with regions of focal honeycombing -----6MWT  525 METERS NO  DESATURATION----------------\par ---\par \par \par CT CHEST - ORDERED BY: ALYSSA JARRETT\par PROCEDURE DATE: 02/04/2023\par CT scan of the chest was obtained without intravenous contrast.\par FINDINGS:\par LYMPH NODES: Calcified mediastinal and hilar lymph nodes.\par HEART/VASCULATURE: Heart size is normal. No pericardial effusion. Coronary artery and aortic calcification.\par AIRWAYS/LUNGS/PLEURA: No endobronchial lesion. Lower lobe predominant reticular and groundglass opacities with traction bronchiectasis and intralobular septal thickening. No honeycombing. Overall appearance is similar from several prior studies but may be slightly increased from 2020. No pleural effusion or pneumothorax.\par UPPER ABDOMEN: Unremarkable\par BONES/SOFT TISSUES: Degenerative changes.\par IMPRESSION:\par Pulmonary fibrosis is unchanged from 2021 but may be slightly increased from 2020.\par \par \par \par \par \par \par PATHOLOGY  EVISA STUDY- 2022--- NEGATIVE FOR UIP \par  TB BIOPSY ---PATHOLOGY  2022--   -- MILD NON SPECIFIC chronic inflammation\par ct chest 8/2021 \par IMPRESSION:\par Interstitial lung disease marked by peripheral reticulation, groundglass opacity and traction bronchiectasis with a lower lobe predominance. No honeycombing.\par \par ct chest 4/2022\par IMPRESSION:.\par Findings of pulmonary fibrosis unchanged compared to 8/12/2021\par \par hst did not show KWADWO\par \par 10/2021-- ILD--no resp complaints -NNEDS CPET ---NO KWADWO ON PSEG--------WILL TRY SA SHORT  COURSE OF STEROIDS  ---QUNAT GOLD POSITIVE\par \par 11/2021----ILD--no resp complaints felt better on steroids----   we will continue on low-dose 7.5 mg p.o. daily-----NEEDS CPET ---NO KWADWO ON PSEG-------  ---QUNAT GOLD POSITIVE\par \par 1/2022 ILD-- no resp complaints, continues on pred 7.5mg qd, CPET and 6 minute walk testing reviewed with patient both results wnl\par \par 6/2022 ILD--off pred, no resp complaints --- refer to Dr. Leon agreeable for bronchoscopy and EVISIA STUDY-----\par \par \par \par AUGUST 2022--  DEVELOPED COVID ---AFEBRILE, COUGH  START PAXLOVID  --HOME LABS \par \par \par AUGUST 11 2022-------FEELS  BETTER  ON PAXLOVID----CONTD LOW DOSE PREDNISONE-----SAT ,  AFEBRILE,  WILL F//U IN OFFICE IN 2-3 WEEKS TIME\par \par 1/2023 ILD- doing well on pred 10mg RECOVERED FROM COVID---DID NOT DO THAT WELL ON 6MWT ON VICKI 3 2023\par ---WILL REPEAT CT CHEST \par \par \par MARCH 2023-----off prednisone feels much better----needs a repeat 6-minute walk test-CT scan  2023-AS COMPARED TO THAT OF 2021  is stable------ we discussed Ofev but wants to wait------ follow-up in 6 months with repeat PFT--systemic hypertension much better controlled\par Observe off prednisone for 1 --- [TextBox_100] : 8/2021 [TextBox_108] : 3.1

## 2023-04-03 NOTE — DISCUSSION/SUMMARY
[FreeTextEntry1] : ---Assessment plan----------The patient has been referred here for further opinion regarding pulmonary problem,\par - 67-year-old male history of interstitial lung disease ------------ no history of any collagen vascular disorder--- CT suggestive of UIP pattern  ---[ BROTEHR  HAS  IPF]----\par \par \par \par 1 ILD ----S/P r bronchoscopy IN 2022 TBB  ?POSSIBLE NSIP PATTERN  -- and EVISIA STUDY  NEGATIVE  ------ ??NSIP --- -off prednisone feels much better----needs a repeat 6-minute walk test-CT scan  2023-AS COMPARED TO THAT OF 2021  is stable------ we discussed Ofev but wants to wait------ follow-up in 6 months with repeat PFT-\par Observe off prednisone for 1NOW---\par \par 2   PFT  before next office visit\par 3 QUANTGOLD  positive---will consider Rifapentine and INH weekly regimen for 3 months pending lung CT results\par 4- COVID  8/7/2022-----RECOVERED ---   \par 5--systemic hypertension much better controlled\par  f/u in 3-4m---\par \par Thanks for allowing  me to participate  in the care of this patient.  Patient at this time  will follow  the above mentioned recommendations and return back for follow up visit. If you have any questions  I can be reached  at #[phone] (beeper)  or  [phone] (office).\par \par Daniel Garzon MD, [facility] \par

## 2023-07-18 NOTE — PHYSICAL EXAM
INFECTIOUS DISEASE PROGRESS NOTE    Williams Rudolph  77 year old male  MRN : [id number]    Date: 7/18/2023     Attending physician : Mendoza Sherman MD    Reason for consult / chief complaint :  Community-acquired pneumonia with generalized weakness, lethargy, persistent consolidation    Interval history :  Slow improvement in cough and shortness of breath denies any fever and chills tolerating antibiotics    Subjective :  Cough, bringing up white phlegm    Vitals:   Vitals:    07/18/23 0852   BP: 116/61   Pulse: 95   Resp:    Temp: 97.5 °F (36.4 °C)       Physical Examination:  General : Awake, Alert, Oriented x 3. No acute distress. Well developed, well nourished.   HEENT : Head is normocephalic, atraumatic. PERLLA. No scleral icterus. Oral mucosa moist without lesions. Good dentition.   Neck : Supple, No LAD. No thyromegaly.   Lungs :  Right upper lung rhonchi and bronchial breath sounds  Heart : Regular rate and rhythm. No murmurs, gallops, or rubs.  Abdomen : Soft, positive bowel sounds, Non tender, Non distended. No masses or hepatosplenomegaly appreciated.   Musculoskeletal : No clubbing, cyanosis, or edema in bilateral lower extremities.  Skin : Warm and dry, No lesions, rashes, or ulcers.  Neurological : CN II - XII grossly intact. Grossly non focal.      Current medications:   Current Facility-Administered Medications   Medication Dose Route Frequency Provider Last Rate Last Admin    cefepime (MAXIPIME) 1,000 mg in sodium chloride 0.9 % 100 mL IVPB  1,000 mg Intravenous 2 times per day Kym Trujillo APNP   Completed at 07/18/23 1011    azithromycin (ZITHROMAX) tablet 250 mg  250 mg Oral Daily Iggy Conner MD   250 mg at 07/18/23 0854    metroNIDAZOLE (FLAGYL) tablet 500 mg  500 mg Oral TID Iggy Conner MD   500 mg at 07/18/23 0854    rivaroxaban (XARELTO) tablet 20 mg  20 mg Oral Daily with dinner Jonathan Denney   20 mg at 07/17/23 1814    sodium chloride 0.9 % flush bag 25 mL  25 mL Intravenous PRN  Bernadine Simon MD        sodium chloride (PF) 0.9 % injection 2 mL  2 mL Intracatheter 2 times per day Bernadine Simon MD   2 mL at 07/18/23 0855    Potassium Standard Replacement Protocol (Levels 3.5 and lower)   Does not apply See Admin Instructions Bernadine Simon MD        Magnesium Standard Replacement Protocol   Does not apply See Admin Instructions Bernadine Simon MD        acetaminophen (TYLENOL) tablet 650 mg  650 mg Oral Q4H PRN Bernadine Simon MD        polyethylene glycol (MIRALAX) packet 17 g  17 g Oral Daily PRN Bernadine Simon MD        sodium chloride 0.9 % flush bag 25 mL  25 mL Intravenous PRN Bernadine Simon MD        fluticasone-vilanterol (BREO ELLIPTA) 100-25 MCG/ACT inhaler 1 puff  1 puff Inhalation Daily Resp Bernadine Simon MD   1 puff at 07/18/23 0858    albuterol inhaler 1 puff  1 puff Inhalation Q4H Resp PRN Bernadine Simon MD        benzonatate (TESSALON PERLES) capsule 100 mg  100 mg Oral TID PRN Bernadine Simon MD   100 mg at 07/17/23 0933    clopidogrel (PLAVIX) tablet 75 mg  75 mg Oral Daily Bernadine Simon MD   75 mg at 07/18/23 0854    dilTIAZem (TIAZAC,CARDIZEM CD) 24 hr capsule 180 mg  180 mg Oral Daily Bernadine Simon MD   180 mg at 07/18/23 0854    furosemide (LASIX) tablet 40 mg  40 mg Oral Daily Bernadine Simon MD   40 mg at 07/18/23 0854    hydrOXYzine (ATARAX) tablet 25 mg  25 mg Oral Nightly PRN Bernadine Simon MD        ipratropium-albuterol (DUONEB) 0.5-2.5 (3) MG/3ML nebulizer solution 3 mL  3 mL Nebulization Q6H Resp PRN Bernadine Simon MD   3 mL at 07/17/23 1719    lisinopril (ZESTRIL) tablet 5 mg  5 mg Oral Daily Bernadine Simon MD   5 mg at 07/18/23 0854    metoPROLOL succinate (TOPROL-XL) ER tablet 100 mg  100 mg Oral QAM Bernadine Simon MD   100 mg at 07/18/23 0605    metoPROLOL succinate (TOPROL-XL) ER tablet 75 mg  75 mg Oral Nightly Bernadine Simon MD   75 mg at 07/17/23  2028    pantoprazole (PROTONIX) EC tablet 40 mg  40 mg Oral Daily Bernadine Simon MD   40 mg at 07/18/23 0854    pravastatin (PRAVACHOL) tablet 20 mg  20 mg Oral Nightly Bernadine Simon MD   20 mg at 07/17/23 2028    tamsulosin (FLOMAX) capsule 0.4 mg  0.4 mg Oral Daily PC Bernadine Simon MD   0.4 mg at 07/18/23 0854    sodium chloride 0.9 % flush bag 25 mL  25 mL Intravenous PRN Bernadine Simon MD        sodium chloride (PF) 0.9 % injection 2 mL  2 mL Intracatheter 2 times per day Bernadine Simon MD   2 mL at 07/18/23 0855    dextrose 50 % injection 25 g  25 g Intravenous PRN Bernadine Simon MD        dextrose 50 % injection 12.5 g  12.5 g Intravenous PRN Bernadine Simon MD        glucagon (GLUCAGEN) injection 1 mg  1 mg Intramuscular PRN Bernadine Simon MD        dextrose (GLUTOSE) 40 % gel 15 g  15 g Oral PRN Bernadine Simon MD        dextrose (GLUTOSE) 40 % gel 30 g  30 g Oral PRN Bernadine Simon MD        insulin glargine (LANTUS) injection 24 Units  24 Units Subcutaneous 2 times per day Bernadine Simon MD   24 Units at 07/18/23 0854    insulin lispro (ADMELOG,HumaLOG) - Correction Dose   Subcutaneous TID WC Bernadine Simon MD        insulin lispro (ADMELOG,HumaLOG) - Correction Dose   Subcutaneous Nightly Bernadine Simon MD        Potassium Replacement (Levels 3.6 - 4)   Does not apply See Admin Instructions Bernadine Simon MD        sodium chloride 0.9 % injector flush 50 mL  50 mL Injection PRN Alberto Ferrari MD   50 mL at 07/13/23 2319       Laboratory data:    Recent Labs   Lab 07/18/23  0428 07/17/23  0439 07/16/23  0425 07/14/23  0451 07/13/23  2212 07/13/23 2028 07/13/23  2018   WBC 14.8* 16.2* 13.3*   < >  --   --  16.0*   HCT 31.6* 32.1* 33.1*   < >  --   --  35.0*   HGB 10.1* 10.3* 10.4*   < >  --   --  11.4*   * 444 400   < >  --   --  382   INR  --   --   --   --  1.2  --  1.2   PTT  --   --   --   --  30  --  32*  [No Acute Distress] : no acute distress   SODIUM 137 138 138   < >  --   --  137   POTASSIUM 4.0 4.3 4.1   < >  --   --  4.5   CHLORIDE 108 108 109   < >  --   --  105   CO2 21 22 22   < >  --   --  23   CALCIUM 8.3* 8.7 9.0   < >  --   --  8.7   GLUCOSE 70 93 109*   < >  --   --  121*   BUN 26* 25* 27*   < >  --   --  33*   CREATININE 1.50* 1.47* 1.49*   < >  --    < > 1.80*   AST  --   --   --   --   --   --  71*   GPT  --   --   --   --   --   --  55   ALKPT  --   --   --   --   --   --  219*   BILIRUBIN  --   --   --   --   --   --  0.9   ALBUMIN  --   --   --   --   --   --  2.2*   LIPA  --   --   --   --   --   --  83    < > = values in this interval not displayed.       Recent Labs   Lab 07/13/23  2213   USPG 1.026   UPROT 30*   UWBC Negative   URBC Negative   UNITR Negative   UBILI Negative   UPH 5.0   UROB 0.2       Imaging: Reviewed  XR CHEST PA AND LATERAL 2 VIEWS    Result Date: 7/18/2023  Narrative: EXAM: XR CHEST PA AND LATERAL 2 VIEWS HISTORY: Pneumonia COMPARISON: 7/15/2023 FINDINGS: Heart is normal in size. Pulmonary vasculature is normally distributed. Similar consolidation in the right apex. The remaining lung fields are clear. No pleural effusion.     Impression: IMPRESSION: 1.  Similar right apical consolidation. Electronically Signed by: Mckayla Will MD Signed on: 7/18/2023 9:58 AM Workstation ID: [id number]    XR CHEST AP OR PA    Result Date: 7/15/2023  Narrative: EXAM: XR CHEST AP OR PA INDICATION: pneumonia COMPARISON: 7/13/2023.     Impression: FINDINGS/IMPRESSION: 1. Mild worsening of right upper lobe opacity consistent with pneumonia. 2. No pleural effusion or pneumothorax. 3. Normal heart size. Electronically Signed by: Iseal Bear MD Signed on: 7/15/2023 11:01 AM Workstation ID: [id number]    CTA CHEST PULMONARY EMBOLISM    Result Date: 7/13/2023  Narrative: EXAM: CTA CHEST PULMONARY EMBOLISM INDICATION: Right chest tightness and discomfort with fatigue, cough, shortness of breath. COMPARISON: Chest x-ray from earlier  [Normal Oropharynx] : normal oropharynx [III] : Mallampati Class: III the same day. CT of the chest for lung cancer screening on 11/28/2022. TECHNIQUE: CT angiographic images through the chest were performed with particular attention made to the pulmonary arterial system. 65 mL Omnipaque 350 contrast was given for the study. MIP reformatted images were acquired. This CT exam was performed using one or more of the following dose reduction techniques: Automated exposure control, adjustment of the mA and/or KV according to patient size, and/or use of iterative reconstructive technique. FINDINGS: There is no evidence of pulmonary embolus. Heart size is normal. Aortic valvular calcification and coronary artery calcification are noted. Thoracic aorta is normal in course and caliber. The central pulmonary arteries are normal in size. There is a trace pericardial effusion without pericardial thickening. Bilateral gynecomastia is noted. There is no axillary or supraclavicular adenopathy. Mediastinal adenopathy is identified with prominent right paratracheal lymph nodes and right hilar adenopathy. The largest lymph node measures 13 mm in the right peritracheal region and 12 mm in the right hilar region. Small AP window lymph node is also mildly prominent measuring 7 mm. There is no left hilar adenopathy. The adenopathy is likely reactive but is new from 11/28/2022. There is a dense area of consolidation in the right upper lobe consistent with pneumonia. This was also seen on the patient's previous chest x-ray. There is a spiculated nodule in the right lower lobe measuring 15 x 11 x 15 mm (image 111 of series 301 and image 101 of series 602). This is new from the study of 11/28/2022. This is a concerning finding for possible malignancy. An additional new spiculated nodule is seen in the right upper lobe laterally measuring 14 mm (image 53 of series 301) with a bandlike density in the right middle lobe (image 87 of series 301. Given the acute pneumonia, the other nodules may be inflammatory.  However close follow-up CT imaging in 3 months is suggested to ensure resolution. Emphysematous lung disease is noted. Central airways remain patent. There is no pleural effusion or pneumothorax. Limited images were included through the upper abdomen. There is reflux of contrast into the intrahepatic inferior vena cava and hepatic veins which can be seen in right heart dysfunction. The images of the upper abdomen are otherwise normal. There are prominent anterior bridging osteophytes throughout the thoracic spine suggesting diffuse idiopathic skeletal hyperostosis. There is no acute fracture or bone destructive process. Lower cervical spine spondylosis changes are also present.     Impression:  IMPRESSION: 1. No evidence of pulmonary embolus. 2. Area of dense consolidation in the right upper lobe corresponding to pneumonia as seen on the chest x-ray. 3. Additional spiculated nodules are identified new from the patient's study of 11/28/2022. This includes a 15 mm right lower lobe nodule. While the appearance is concerning for neoplastic process, given the presence of the acute infectious/inflammation, inflammatory nodules cannot be excluded. Close interval follow-up CT imaging 3 months is strongly suggested to ensure resolution. 4. Mediastinal and right hilar adenopathy, likely reactive. Attention on follow-up studies is also suggested. Electronically Signed by: Nabila Aranda MD Signed on: 7/13/2023 11:40 PM Workstation ID: [id number]    XR CHEST AP OR PA    Result Date: 7/13/2023  Narrative: EXAM: XR CHEST AP OR PA HISTORY: rule out pna COMPARISON: Chest radiograph 6/9/2023 TECHNIQUE:  Single, AP upright view of the chest. FINDINGS: Cardiomediastinal contours are normal. Calcific atherosclerosis aortic arch. Pulmonary vasculature is distributed normally. New consolidation in the right upper lung. Strandy bibasilar opacities, likely atelectasis. No pleural effusion or pneumothorax.     Impression: IMPRESSION: New  [No Neck Mass] : no neck mass [No Abnormalities] : no abnormalities consolidation in the right upper lung consistent with pneumonia. Attention at imaging follow-up is advised to ensure resolution. I, Attending Radiologist Patrick Mustafa MD, have reviewed the images and report and concur with these findings interpreted by Resident Radiologist, Blake Newton DO. Electronically Signed by: Patrick Mustafa MD Signed on: 7/13/2023 9:48 PM Workstation ID: [id number]     Culture data: Reviewed    Blood culture without any growth sputum cultures pending Fungitell assay, Histoplasma, urinary Blastomyces antigen pending    Antimicrobials:   IV ceftriaxone and azithromycin      Assessment / Diagnoses:  This is a 77 year old male with history of COPD, type 2 diabetes, hospitalized with cough, shortness of breath, noticed to have right upper lung dense consolidation   Generalized weakness, lethargy   History of COPD  Obstructive sleep apnea   Obesity      Plan / Recommendations:    Concerns for aspiration, will switch antibiotics to IV cefepime and start Flagyl  Continue azithromycin  Await atypical pneumonia workup was sputum Gram smear and cultures, Histoplasma, Blastomyces antigen   Discussed with Pulmonary, to hold off on bronchoscopy at this time      Discussed with the patient and Dr. Iggy Conner MD  Infectious Disease  Office: [phone]     [Benign] : benign [Normal Gait] : normal gait [No Clubbing] : no clubbing [Normal Color/ Pigmentation] : normal color/ pigmentation [No Focal Deficits] : no focal deficits [Oriented x3] : oriented x3 [TextBox_[id number]] : Bilateral fine crackles

## 2023-08-11 NOTE — ASU PATIENT PROFILE, ADULT - TEACHING/LEARNING EDUCATIONAL LEVEL
C/c: h/o Diverticulitis with abscess    HPI: 59F, pmh of HTN, recurrent diverticulitis with recent flare up complicated by abscess who is admitted for elective hand assisted lap sigmoidectomy.  Hospitalist service consulted for medical comanagement.   Pt seen and examined on 1N. doing well. +liquid bm with some blood. Pain controlled with oxycodone. Ambulated. Had slight hematuria after mendiola was removed, but thinks its clearing up. No n/v with liquids.   No sob/chest pain. No n/v.    ROS: all 10 systems reviewed and is as above otherwise negative.     Vital Signs Last 24 Hrs  T(C): 37.1 (10 Aug 2023 08:16), Max: 37.1 (09 Aug 2023 16:09)  T(F): 98.7 (10 Aug 2023 08:16), Max: 98.8 (09 Aug 2023 20:35)  HR: 86 (10 Aug 2023 08:16) (71 - 98)  BP: 160/89 (10 Aug 2023 08:16) (111/81 - 160/89)  BP(mean): 104 (09 Aug 2023 16:09) (104 - 104)  RR: 18 (10 Aug 2023 08:16) (10 - 19)  SpO2: 96% (10 Aug 2023 08:16) (96% - 100%)    Parameters below as of 10 Aug 2023 08:16  Patient On (Oxygen Delivery Method): room air    PHYSICAL EXAM:    GENERAL: Comfortable, no acute distress   HEAD:  Normocephalic, atraumatic  EYES: EOMI, PERRLA  HEENT: Moist mucous membranes  NECK: Supple, No JVD  NERVOUS SYSTEM:  Alert & Oriented X3, Motor Strength 5/5 B/L upper and lower extremities  CHEST/LUNG: Clear to auscultation bilaterally  HEART: Regular rate and rhythm  ABDOMEN: Soft, appropriate post op tenderness, midline dressing intact, some surrounding ecchymosis,  lap sites intact, BS+  GENITOURINARY: Voiding, no palpable bladder  EXTREMITIES:   No clubbing, cyanosis, or edema  MUSCULOSKELETAL- No muscle tenderness, no joint tenderness  SKIN-no rash    LABS:                        14.0   13.28 )-----------( 375      ( 10 Aug 2023 08:35 )             41.4     08-10    136  |  101  |  5<L>  ----------------------------<  138<H>  3.2<L>   |  30  |  1.09    Ca    8.9      10 Aug 2023 08:35  Phos  2.0     08-10  Mg     2.0     08-10        Urinalysis Basic - ( 10 Aug 2023 08:35 )    Color: x / Appearance: x / SG: x / pH: x  Gluc: 138 mg/dL / Ketone: x  / Bili: x / Urobili: x   Blood: x / Protein: x / Nitrite: x   Leuk Esterase: x / RBC: x / WBC x   Sq Epi: x / Non Sq Epi: x / Bacteria: x      MEDICATIONS  (STANDING):  acetaminophen   IVPB .. 1000 milliGRAM(s) IV Intermittent every 6 hours  alvimopan 12 milliGRAM(s) Oral every 12 hours  cefoTEtan  IVPB 2 Gram(s) IV Intermittent every 12 hours  enoxaparin Injectable 40 milliGRAM(s) SubCutaneous every 24 hours  multivitamin 1 Tablet(s) Oral daily  potassium chloride    Tablet ER 40 milliEquivalent(s) Oral every 4 hours  sodium chloride 0.9% lock flush 3 milliLiter(s) IV Push every 8 hours  valsartan 160 milliGRAM(s) Oral daily    MEDICATIONS  (PRN):  oxyCODONE    IR 5 milliGRAM(s) Oral every 4 hours PRN Moderate Pain (4 - 6)  oxyCODONE    IR 10 milliGRAM(s) Oral every 4 hours PRN Severe Pain (7 - 10)      ASSESSMENT AND PLAN:  59f, PMH as above a/w    1. H/o Recurrent sigmoid diverticulitis with recent flare/abscess:  -s/p hand assisted lap sigmoidectomy POD#1  -now has bowel function  -advance diet per crs  -pain control  -incentive spirometry  -ambulate     2. Hypokalemia/hypophosphatemia:  -replete    3. HTN:  -continue valsartan with hold parameters (already took dose today)  -hold hctz.    4. DVT px  -lovenox.                   
C/c: h/o Diverticulitis with abscess    HPI: 59F, pmh of HTN, recurrent diverticulitis with recent flare up complicated by abscess who is admitted for elective hand assisted lap sigmoidectomy.  Hospitalist service consulted for medical comanagement.   Pt seen and examined this am. doing well. having bms. Tolerating solid food. no difficulty voiding. ambulating. no sob/chest pain. wants to go home.     ROS: all 10 systems reviewed and is as above otherwise negative.     Vital Signs Last 24 Hrs  T(C): 37 (11 Aug 2023 08:37), Max: 37.3 (10 Aug 2023 21:34)  T(F): 98.6 (11 Aug 2023 08:37), Max: 99.1 (10 Aug 2023 21:34)  HR: 106 (11 Aug 2023 10:22) (88 - 106)  BP: 147/88 (11 Aug 2023 10:22) (144/90 - 159/89)  RR: 18 (11 Aug 2023 08:37) (18 - 18)  SpO2: 100% (11 Aug 2023 08:37) (94% - 100%)    Parameters below as of 11 Aug 2023 08:37  Patient On (Oxygen Delivery Method): room air        PHYSICAL EXAM:    GENERAL: Comfortable, no acute distress   HEAD:  Normocephalic, atraumatic  EYES: EOMI, PERRLA  HEENT: Moist mucous membranes  NECK: Supple, No JVD  NERVOUS SYSTEM:  Alert & Oriented X3, Motor Strength 5/5 B/L upper and lower extremities  CHEST/LUNG: Clear to auscultation bilaterally  HEART: Regular rate and rhythm  ABDOMEN: Soft, appropriate post op tenderness, midline dressing intact, some surrounding ecchymosis,  lap sites intact, BS+  GENITOURINARY: Voiding, no palpable bladder  EXTREMITIES:   No clubbing, cyanosis, or edema  MUSCULOSKELETAL- No muscle tenderness, no joint tenderness  SKIN-no rash    LABS:                        13.9   10.57 )-----------( 330      ( 11 Aug 2023 07:10 )             42.2     08-11    139  |  104  |  5<L>  ----------------------------<  106<H>  3.6   |  29  |  0.91    Ca    8.8      11 Aug 2023 07:10  Phos  2.0     08-11  Mg     2.4     08-11        Urinalysis Basic - ( 11 Aug 2023 07:10 )    Color: x / Appearance: x / SG: x / pH: x  Gluc: 106 mg/dL / Ketone: x  / Bili: x / Urobili: x   Blood: x / Protein: x / Nitrite: x   Leuk Esterase: x / RBC: x / WBC x   Sq Epi: x / Non Sq Epi: x / Bacteria: x    MEDICATIONS  (STANDING):  alvimopan 12 milliGRAM(s) Oral every 12 hours  cefoTEtan  IVPB 2 Gram(s) IV Intermittent every 12 hours  enoxaparin Injectable 40 milliGRAM(s) SubCutaneous every 24 hours  hydrochlorothiazide 12.5 milliGRAM(s) Oral daily  multivitamin 1 Tablet(s) Oral daily  pantoprazole    Tablet 40 milliGRAM(s) Oral before breakfast  sodium chloride 0.9% lock flush 3 milliLiter(s) IV Push every 8 hours  valsartan 160 milliGRAM(s) Oral daily    MEDICATIONS  (PRN):  ondansetron Injectable 4 milliGRAM(s) IV Push every 6 hours PRN Nausea and/or Vomiting  oxyCODONE    IR 5 milliGRAM(s) Oral every 4 hours PRN Moderate Pain (4 - 6)  oxyCODONE    IR 10 milliGRAM(s) Oral every 4 hours PRN Severe Pain (7 - 10)      ASSESSMENT AND PLAN:  59f, PMH as above a/w    1. H/o Recurrent sigmoid diverticulitis with recent flare/abscess:  -s/p hand assisted lap sigmoidectomy POD#2  -diet advanced  -pain control  -incentive spirometry  -ambulate     2. Hypokalemia/hypophosphatemia:  -repleted    3. HTN:  -continue valsartan with hold parameters  -resume hctz    4. DVT px  -lovenox.                   
SURGERY DAILY PROGRESS NOTE:     Subjective:  Patient seen and examined at bedside during am rounds. Tolerating FLD. No bowel function   AVSS. Denies any fevers, chills, n/v/d, chest pain or shortness of breath    Objective:    MEDICATIONS  (STANDING):  acetaminophen   IVPB .. 1000 milliGRAM(s) IV Intermittent every 6 hours  alvimopan 12 milliGRAM(s) Oral every 12 hours  cefoTEtan  IVPB 2 Gram(s) IV Intermittent every 12 hours  enoxaparin Injectable 40 milliGRAM(s) SubCutaneous every 24 hours  lactated ringers. 1000 milliLiter(s) (125 mL/Hr) IV Continuous <Continuous>  multivitamin 1 Tablet(s) Oral daily  sodium chloride 0.9% lock flush 3 milliLiter(s) IV Push every 8 hours  valsartan 160 milliGRAM(s) Oral daily    MEDICATIONS  (PRN):  oxyCODONE    IR 5 milliGRAM(s) Oral every 4 hours PRN Moderate Pain (4 - 6)  oxyCODONE    IR 10 milliGRAM(s) Oral every 4 hours PRN Severe Pain (7 - 10)      Vital Signs Last 24 Hrs  T(C): 36.9 (10 Aug 2023 00:40), Max: 37.2 (09 Aug 2023 11:11)  T(F): 98.5 (10 Aug 2023 00:40), Max: 99 (09 Aug 2023 11:11)  HR: 90 (10 Aug 2023 00:40) (71 - 100)  BP: 139/89 (10 Aug 2023 00:40) (111/81 - 185/85)  BP(mean): 104 (09 Aug 2023 16:09) (104 - 104)  RR: 18 (10 Aug 2023 00:40) (10 - 19)  SpO2: 96% (10 Aug 2023 00:40) (95% - 100%)    Parameters below as of 10 Aug 2023 00:40  Patient On (Oxygen Delivery Method): room air          PHYSICAL EXAM   Gen: well-appearing, in no acute distress  CV: pulse regularly present   Resp: airway patent, non-labored breathing  Abd: soft, appropriately tender, NP seal in place with eccchymosis around it      I&O's Detail    09 Aug 2023 07:01  -  10 Aug 2023 01:04  --------------------------------------------------------  IN:    Lactated Ringers: 625 mL    Other (mL): 2000 mL  Total IN: 2625 mL    OUT:    Blood Loss (mL): 100 mL    Indwelling Catheter - Urethral (mL): 880 mL  Total OUT: 980 mL    Total NET: 1645 mL          Daily Height in cm: 160.02 (09 Aug 2023 07:13)    Daily     
SURGERY DAILY PROGRESS NOTE:     Subjective:  Patient seen and examined at bedside during am rounds. Tolerating LRD.  Having bowel function.  AVSS. Denies any fevers, chills, n/v/d, chest pain or shortness of breath    Objective:    MEDICATIONS  (STANDING):  alvimopan 12 milliGRAM(s) Oral every 12 hours  cefoTEtan  IVPB 2 Gram(s) IV Intermittent every 12 hours  enoxaparin Injectable 40 milliGRAM(s) SubCutaneous every 24 hours  multivitamin 1 Tablet(s) Oral daily  sodium chloride 0.9% lock flush 3 milliLiter(s) IV Push every 8 hours  valsartan 160 milliGRAM(s) Oral daily    MEDICATIONS  (PRN):  oxyCODONE    IR 5 milliGRAM(s) Oral every 4 hours PRN Moderate Pain (4 - 6)  oxyCODONE    IR 10 milliGRAM(s) Oral every 4 hours PRN Severe Pain (7 - 10)      Vital Signs Last 24 Hrs  T(C): 37.3 (10 Aug 2023 21:34), Max: 37.3 (10 Aug 2023 21:34)  T(F): 99.1 (10 Aug 2023 21:34), Max: 99.1 (10 Aug 2023 21:34)  HR: 94 (10 Aug 2023 21:34) (74 - 94)  BP: 159/89 (10 Aug 2023 21:34) (135/85 - 160/89)  BP(mean): --  RR: 18 (10 Aug 2023 21:34) (18 - 18)  SpO2: 99% (10 Aug 2023 21:34) (94% - 99%)    Parameters below as of 10 Aug 2023 21:34  Patient On (Oxygen Delivery Method): room air          PHYSICAL EXAM   Gen: well-appearing, in no acute distress  CV: pulse regularly present   Resp: airway patent, non-labored breathing  Abd: soft, NTND; no rebound or guarding. Incision c/d/i, NP seal holding suction      I&O's Detail    09 Aug 2023 07:01  -  10 Aug 2023 07:00  --------------------------------------------------------  IN:    Lactated Ringers: 625 mL    Other (mL): 2000 mL  Total IN: 2625 mL    OUT:    Blood Loss (mL): 100 mL    Indwelling Catheter - Urethral (mL): 1980 mL  Total OUT: 2080 mL    Total NET: 545 mL      10 Aug 2023 07:01  -  11 Aug 2023 00:33  --------------------------------------------------------  IN:  Total IN: 0 mL    OUT:    Voided (mL): 550 mL  Total OUT: 550 mL    Total NET: -550 mL          Daily     Daily     LABS:                        14.0   13.28 )-----------( 375      ( 10 Aug 2023 08:35 )             41.4     08-10    136  |  101  |  5<L>  ----------------------------<  138<H>  3.2<L>   |  30  |  1.09    Ca    8.9      10 Aug 2023 08:35  Phos  2.0     08-10  Mg     2.0     08-10        Urinalysis Basic - ( 10 Aug 2023 08:35 )    Color: x / Appearance: x / SG: x / pH: x  Gluc: 138 mg/dL / Ketone: x  / Bili: x / Urobili: x   Blood: x / Protein: x / Nitrite: x   Leuk Esterase: x / RBC: x / WBC x   Sq Epi: x / Non Sq Epi: x / Bacteria: x            
high school

## 2023-09-06 ENCOUNTER — NON-APPOINTMENT (OUTPATIENT)
Age: 68
End: 2023-09-06

## 2023-09-06 ENCOUNTER — APPOINTMENT (OUTPATIENT)
Dept: CARDIOLOGY | Facility: CLINIC | Age: 68
End: 2023-09-06
Payer: MEDICARE

## 2023-09-06 VITALS
HEIGHT: 63 IN | WEIGHT: 161 LBS | BODY MASS INDEX: 28.53 KG/M2 | SYSTOLIC BLOOD PRESSURE: 124 MMHG | DIASTOLIC BLOOD PRESSURE: 64 MMHG | OXYGEN SATURATION: 99 % | HEART RATE: 64 BPM

## 2023-09-06 PROCEDURE — 93000 ELECTROCARDIOGRAM COMPLETE: CPT

## 2023-09-06 PROCEDURE — 99213 OFFICE O/P EST LOW 20 MIN: CPT

## 2023-09-06 RX ORDER — LISINOPRIL 10 MG/1
10 TABLET ORAL
Qty: 90 | Refills: 3 | Status: ACTIVE | COMMUNITY
Start: 2022-01-26 | End: 1900-01-01

## 2023-09-06 RX ORDER — AMLODIPINE BESYLATE 5 MG/1
5 TABLET ORAL
Qty: 90 | Refills: 3 | Status: ACTIVE | COMMUNITY
Start: 1900-01-01 | End: 1900-01-01

## 2023-09-06 NOTE — PHYSICAL EXAM
[Well Developed] : well developed [Well Nourished] : well nourished [No Acute Distress] : no acute distress [Normal Conjunctiva] : normal conjunctiva [Normal Venous Pressure] : normal venous pressure [No Carotid Bruit] : no carotid bruit [Normal S1, S2] : normal S1, S2 [No Murmur] : no murmur [No Rub] : no rub [No Gallop] : no gallop [Soft] : abdomen soft [Non Tender] : non-tender [No Masses/organomegaly] : no masses/organomegaly [Normal Bowel Sounds] : normal bowel sounds [Normal Gait] : normal gait [No Edema] : no edema [No Cyanosis] : no cyanosis [No Clubbing] : no clubbing [No Varicosities] : no varicosities [No Rash] : no rash [No Skin Lesions] : no skin lesions [Moves all extremities] : moves all extremities [No Focal Deficits] : no focal deficits [Normal Speech] : normal speech [Alert and Oriented] : alert and oriented [Normal memory] : normal memory [de-identified] : B/L  fine crackles at the bases.

## 2023-09-06 NOTE — HISTORY OF PRESENT ILLNESS
[FreeTextEntry1] : 67 yr M with HTN , IPF with no known CV ds. He is here for CV screening.   walks 15 K steps. with no symptoms.   Patient denies chest pain, dyspnea, orthopnea, PND, edema, palpitations, syncope or presyncope.  Patient is compliant with their medications.

## 2023-09-06 NOTE — DISCUSSION/SUMMARY
[FreeTextEntry1] : PHT: FU with Dr Garzon  HTN:at goal. he will continue taking the current doses in addition to work on his diet lifestyle with salt restriction weight management and regular exercises and sleep hygiene.    will request lipid profile from PCP  Follow-up with us in 6 months [EKG obtained to assist in diagnosis and management of assessed problem(s)] : EKG obtained to assist in diagnosis and management of assessed problem(s)

## 2023-10-02 ENCOUNTER — APPOINTMENT (OUTPATIENT)
Dept: PULMONOLOGY | Facility: CLINIC | Age: 68
End: 2023-10-02
Payer: MEDICARE

## 2023-10-02 ENCOUNTER — MED ADMIN CHARGE (OUTPATIENT)
Age: 68
End: 2023-10-02

## 2023-10-02 VITALS
TEMPERATURE: 98.4 F | SYSTOLIC BLOOD PRESSURE: 116 MMHG | WEIGHT: 162 LBS | DIASTOLIC BLOOD PRESSURE: 75 MMHG | HEIGHT: 63 IN | HEART RATE: 64 BPM | OXYGEN SATURATION: 97 % | BODY MASS INDEX: 28.7 KG/M2 | RESPIRATION RATE: 15 BRPM

## 2023-10-02 DIAGNOSIS — J84.9 INTERSTITIAL PULMONARY DISEASE, UNSPECIFIED: ICD-10-CM

## 2023-10-02 PROCEDURE — G0008: CPT

## 2023-10-02 PROCEDURE — 99215 OFFICE O/P EST HI 40 MIN: CPT | Mod: 25

## 2023-10-02 PROCEDURE — 90662 IIV NO PRSV INCREASED AG IM: CPT

## 2023-10-02 PROCEDURE — 36415 COLL VENOUS BLD VENIPUNCTURE: CPT

## 2023-10-03 LAB
ALBUMIN SERPL ELPH-MCNC: 5.1 G/DL
ALP BLD-CCNC: 82 U/L
ALT SERPL-CCNC: 19 U/L
ANION GAP SERPL CALC-SCNC: 13 MMOL/L
AST SERPL-CCNC: 22 U/L
BILIRUB SERPL-MCNC: 0.3 MG/DL
BUN SERPL-MCNC: 14 MG/DL
CALCIUM SERPL-MCNC: 10.3 MG/DL
CHLORIDE SERPL-SCNC: 102 MMOL/L
CO2 SERPL-SCNC: 24 MMOL/L
CREAT SERPL-MCNC: 0.87 MG/DL
EGFR: 95 ML/MIN/1.73M2
GLUCOSE SERPL-MCNC: 90 MG/DL
HCT VFR BLD CALC: 41.1 %
HGB BLD-MCNC: 13.1 G/DL
MCHC RBC-ENTMCNC: 28.2 PG
MCHC RBC-ENTMCNC: 31.9 GM/DL
MCV RBC AUTO: 88.6 FL
NT-PROBNP SERPL-MCNC: 61 PG/ML
PLATELET # BLD AUTO: 261 K/UL
POTASSIUM SERPL-SCNC: 5 MMOL/L
PROT SERPL-MCNC: 8.1 G/DL
RBC # BLD: 4.64 M/UL
RBC # FLD: 12.9 %
SODIUM SERPL-SCNC: 139 MMOL/L
WBC # FLD AUTO: 9.32 K/UL

## 2024-02-12 ENCOUNTER — APPOINTMENT (OUTPATIENT)
Dept: PULMONOLOGY | Facility: CLINIC | Age: 69
End: 2024-02-12
Payer: MEDICARE

## 2024-02-12 VITALS
RESPIRATION RATE: 14 BRPM | DIASTOLIC BLOOD PRESSURE: 70 MMHG | HEART RATE: 74 BPM | OXYGEN SATURATION: 95 % | HEIGHT: 63 IN | BODY MASS INDEX: 28.88 KG/M2 | WEIGHT: 163 LBS | TEMPERATURE: 97.8 F | SYSTOLIC BLOOD PRESSURE: 133 MMHG

## 2024-02-12 PROCEDURE — ZZZZZ: CPT

## 2024-02-12 PROCEDURE — 94618 PULMONARY STRESS TESTING: CPT

## 2024-02-12 PROCEDURE — 94010 BREATHING CAPACITY TEST: CPT

## 2024-02-12 PROCEDURE — 99215 OFFICE O/P EST HI 40 MIN: CPT | Mod: 25

## 2024-02-12 NOTE — END OF VISIT
[FreeTextEntry3] :   I, Dr. Daniel Garzon  personally performed the evaluation and management (E/M) services for this established patient who presents today with (a) new problem(s)/exacerbation of (an) existing condition(s). That E/M includes conducting the clinically appropriate interval history &/or exam, assessing all new/exacerbated conditions, and establishing a new plan of care. Today, my MICKEY, Belia Beth NP, , was here to observe my evaluation and management service for this new problem/exacerbated condition and follow the plan of care established by me going forward. [Time Spent: ___ minutes] : I have spent [unfilled] minutes of time on the encounter.

## 2024-02-12 NOTE — HISTORY OF PRESENT ILLNESS
[Former] : former [TextBox_4] : This letter  is regarding your patient  who  attended pulmonary out patient office today.  I have reviewed  patient's  past history, social history, family history and medication list. I also  reviewed nurse practitioners/ and fellows  notes and assessment and agree with it.   The patient was referred by Dr.ALEX HODGE------- 67  year-old male history of interstitial lung disease ------------ no history of any collagen vascular disorder------- no family history of interstitial lung disease------ BROTHER HAS LUNG TRASNPLANT [CAUSE PT NOT SURE] -----  ------No history of , fever, chills , rigors, chest pain, or hemoptysis. Questionable history of Raynaud's phenomenon. No h/o significant weight loss in last few months. No history of liver dysfunction , collagen vascular disorder or chronic thromboembolic disease. I would classify the patient's dyspnea as WHO  FUNCTIONAL CLASS II--------   PFT 2022-----MILD restriction decreased DLCO 6MWT  2021------523 METERS------ CPET 2021  -----VO2  21 ML/KG/MIN   ----Echo  date------ 2021  ef 65,pasp44  ---stress echo 3/2022  Conclusions:  1. Exercise capacity is 8 METS, which is fair for age and gender. 2. Normal hemodynamic response. 3. Normal electrocardiographic response. 4. Normal augmentation in left ventricular systolic function. 5. Appropriate heart rate response. 6. Appropriate blood pressure response. 7. Normal stress echocardiogram with no evidence of inducible ischemia.  Baseline images demonstrated mild pulmonary hypertension (estimated pulmonary artery systolic pressure 41 mmHg).  The estimated pulmonary artery systolic pressure did not change significantly following peak exercise.  ----Pft date--------- May 2021-----borderline restrictive lung defect is  ----Ct scan date------- 2021----ZWANGER PERISI-----mild interstitial lung disease at the bases with regions of focal honeycombing -----6MWT  525 METERS NO  DESATURATION---------------- ---   CT CHEST - ORDERED BY: ALYSSA JARRETT PROCEDURE DATE: 02/04/2023 CT scan of the chest was obtained without intravenous contrast. FINDINGS: LYMPH NODES: Calcified mediastinal and hilar lymph nodes. HEART/VASCULATURE: Heart size is normal. No pericardial effusion. Coronary artery and aortic calcification. AIRWAYS/LUNGS/PLEURA: No endobronchial lesion. Lower lobe predominant reticular and groundglass opacities with traction bronchiectasis and intralobular septal thickening. No honeycombing. Overall appearance is similar from several prior studies but may be slightly increased from 2020. No pleural effusion or pneumothorax. UPPER ABDOMEN: Unremarkable BONES/SOFT TISSUES: Degenerative changes. IMPRESSION: Pulmonary fibrosis is unchanged from 2021 but may be slightly increased from 2020.       PATHOLOGY  EVISA STUDY- 2022--- NEGATIVE FOR UIP   TB BIOPSY ---PATHOLOGY  2022--   -- MILD NON SPECIFIC chronic inflammation ct chest 8/2021  IMPRESSION: Interstitial lung disease marked by peripheral reticulation, groundglass opacity and traction bronchiectasis with a lower lobe predominance. No honeycombing.  ct chest 4/2022 IMPRESSION:. Findings of pulmonary fibrosis unchanged compared to 8/12/2021  hst did not show KWADWO  10/2021-- ILD--no resp complaints -NNEDS CPET ---NO KWADWO ON PSEG--------WILL TRY SA SHORT  COURSE OF STEROIDS  ---QUNAT GOLD POSITIVE  11/2021----ILD--no resp complaints felt better on steroids----   we will continue on low-dose 7.5 mg p.o. daily-----NEEDS CPET ---NO KWADWO ON PSEG-------  ---QUNAT GOLD POSITIVE  1/2022 ILD-- no resp complaints, continues on pred 7.5mg qd, CPET and 6 minute walk testing reviewed with patient both results wnl  6/2022 ILD--off pred, no resp complaints --- refer to Dr. Leon agreeable for bronchoscopy and EVISIA STUDY-----    AUGUST 2022--  DEVELOPED COVID ---AFEBRILE, COUGH  START PAXLOVID  --HOME LABS    AUGUST 11 2022-------FEELS  BETTER  ON PAXLOVID----CONTD LOW DOSE PREDNISONE-----SAT ,  AFEBRILE,  WILL F//U IN OFFICE IN 2-3 WEEKS TIME  1/2023 ILD- doing well on pred 10mg RECOVERED FROM COVID---DID NOT DO THAT WELL ON 6MWT ON VICKI 3 2023 ---WILL REPEAT CT CHEST    MARCH 2023-----off prednisone feels much better----needs a repeat 6-minute walk test-CT scan  2023-AS COMPARED TO THAT OF 2021  is stable------ we discussed Ofev but wants to wait------ follow-up in 6 months with repeat PFT--systemic hypertension much better controlled Observe off prednisone for 1 ---  10/2023 ILD stable- no present complaints   ----- needs repeat ct chest-pft  -6mwt-------   - we discussed Ofev  and lung transplant referral [his brother has had lung transplant possible familial IPF  ]  -but wants to wait------ follow-up in 6 months with repeat PFT--systemic hypertension much better controlled Observe off prednisone for 1 --- [TextBox_100] : 8/2021 [TextBox_108] : 3.1

## 2024-02-12 NOTE — DISCUSSION/SUMMARY
[FreeTextEntry1] : ---Assessment plan----------The patient has been referred here for further opinion regarding pulmonary problem, - 67-year-old male history of interstitial lung disease ------------ no history of any collagen vascular disorder--- CT suggestive of UIP pattern  ---[ BROTEHR  HAS  IPF]----  1 ILD ----S/P r bronchoscopy IN 2022 TBB  ?POSSIBLE NSIP PATTERN  -- and EVISIA STUDY  NEGATIVE  ------ ??NSIP --- -off prednisone feels much better----needs a repeat 6-minute walk test-CT scan  2023-AS COMPARED TO THAT OF 2021  is stable------   we discussed Ofev  and lung transplant referral [his brother has had lung transplant possible familial IPF  ]  -but wants to wait------ follow-up in 6 months with repeat PFT--systemic hypertension much better controlled--Observe off prednisone for 1 ---  2   PFT  before next office visit 3 QUANTGOLD  positive---will consider Rifapentine and INH weekly regimen for 3 months pending lung CT results 4-s/p influenza vac --up to date-  5--systemic hypertension much better controlled  f/u in 3-4m---  Thanks for allowing  me to participate  in the care of this patient.  Patient at this time  will follow  the above mentioned recommendations and return back for follow up visit. If you have any questions  I can be reached  at #[phone] (office).  Daniel Garzon MD, [facility]

## 2024-03-06 ENCOUNTER — NON-APPOINTMENT (OUTPATIENT)
Age: 69
End: 2024-03-06

## 2024-03-06 ENCOUNTER — APPOINTMENT (OUTPATIENT)
Dept: CARDIOLOGY | Facility: CLINIC | Age: 69
End: 2024-03-06
Payer: MEDICARE

## 2024-03-06 VITALS
RESPIRATION RATE: 16 BRPM | TEMPERATURE: 98 F | BODY MASS INDEX: 28.87 KG/M2 | SYSTOLIC BLOOD PRESSURE: 151 MMHG | DIASTOLIC BLOOD PRESSURE: 82 MMHG | OXYGEN SATURATION: 98 % | HEART RATE: 64 BPM | WEIGHT: 163 LBS

## 2024-03-06 DIAGNOSIS — I10 ESSENTIAL (PRIMARY) HYPERTENSION: ICD-10-CM

## 2024-03-06 DIAGNOSIS — I27.0 PRIMARY PULMONARY HYPERTENSION: ICD-10-CM

## 2024-03-06 PROCEDURE — 93000 ELECTROCARDIOGRAM COMPLETE: CPT

## 2024-03-06 PROCEDURE — 99214 OFFICE O/P EST MOD 30 MIN: CPT

## 2024-03-06 RX ORDER — PREDNISONE 2.5 MG/1
2.5 TABLET ORAL
Qty: 90 | Refills: 2 | Status: DISCONTINUED | COMMUNITY
Start: 2021-10-05 | End: 2024-03-06

## 2024-03-06 RX ORDER — ALBUTEROL SULFATE 90 UG/1
108 (90 BASE) INHALANT RESPIRATORY (INHALATION)
Qty: 7 | Refills: 0 | Status: DISCONTINUED | COMMUNITY
Start: 2021-11-19 | End: 2024-03-06

## 2024-03-06 RX ORDER — UBIDECARENONE/VIT E ACET 100MG-5
CAPSULE ORAL
Refills: 0 | Status: DISCONTINUED | COMMUNITY
End: 2024-03-06

## 2024-03-06 RX ORDER — ALBUTEROL 90 MCG
90 AEROSOL (GRAM) INHALATION
Refills: 0 | Status: DISCONTINUED | COMMUNITY
End: 2024-03-06

## 2024-03-06 NOTE — PHYSICAL EXAM
[Well Developed] : well developed [No Acute Distress] : no acute distress [Well Nourished] : well nourished [Normal Venous Pressure] : normal venous pressure [Normal Conjunctiva] : normal conjunctiva [No Carotid Bruit] : no carotid bruit [Normal S1, S2] : normal S1, S2 [No Murmur] : no murmur [No Rub] : no rub [No Gallop] : no gallop [Non Tender] : non-tender [Soft] : abdomen soft [No Masses/organomegaly] : no masses/organomegaly [Normal Bowel Sounds] : normal bowel sounds [Normal Gait] : normal gait [No Edema] : no edema [No Clubbing] : no clubbing [No Cyanosis] : no cyanosis [No Rash] : no rash [No Varicosities] : no varicosities [No Skin Lesions] : no skin lesions [No Focal Deficits] : no focal deficits [Moves all extremities] : moves all extremities [Alert and Oriented] : alert and oriented [Normal memory] : normal memory [Normal Speech] : normal speech [de-identified] : B/L  fine crackles at the bases.

## 2024-03-06 NOTE — HISTORY OF PRESENT ILLNESS
[FreeTextEntry1] : 68 yr M with HTN , IPF with no known CV ds.   Patient denies chest pain, dyspnea, orthopnea, PND, edema, palpitations, syncope or presyncope.  Patient is compliant with their medications. Walks 15-20K steps.

## 2024-03-06 NOTE — DISCUSSION/SUMMARY
[FreeTextEntry1] : PHT: FU with Dr Garzon  HTN:  mild elevation didn't take his meds this morning. Emphasize compliance, salt restriction, sleep hygiene.     Follow-up with us in 6 months [EKG obtained to assist in diagnosis and management of assessed problem(s)] : EKG obtained to assist in diagnosis and management of assessed problem(s)

## 2024-03-16 ENCOUNTER — OUTPATIENT (OUTPATIENT)
Dept: OUTPATIENT SERVICES | Facility: HOSPITAL | Age: 69
LOS: 1 days | End: 2024-03-16
Payer: COMMERCIAL

## 2024-03-16 ENCOUNTER — APPOINTMENT (OUTPATIENT)
Dept: CT IMAGING | Facility: CLINIC | Age: 69
End: 2024-03-16
Payer: MEDICARE

## 2024-03-16 DIAGNOSIS — Z98.890 OTHER SPECIFIED POSTPROCEDURAL STATES: Chronic | ICD-10-CM

## 2024-03-16 DIAGNOSIS — J84.9 INTERSTITIAL PULMONARY DISEASE, UNSPECIFIED: ICD-10-CM

## 2024-03-16 DIAGNOSIS — Z90.49 ACQUIRED ABSENCE OF OTHER SPECIFIED PARTS OF DIGESTIVE TRACT: Chronic | ICD-10-CM

## 2024-03-16 PROCEDURE — 71250 CT THORAX DX C-: CPT | Mod: 26

## 2024-03-16 PROCEDURE — 71250 CT THORAX DX C-: CPT

## 2024-04-25 ENCOUNTER — APPOINTMENT (OUTPATIENT)
Dept: CV DIAGNOSITCS | Facility: HOSPITAL | Age: 69
End: 2024-04-25

## 2024-04-25 ENCOUNTER — OUTPATIENT (OUTPATIENT)
Dept: OUTPATIENT SERVICES | Facility: HOSPITAL | Age: 69
LOS: 1 days | End: 2024-04-25
Payer: MEDICARE

## 2024-04-25 ENCOUNTER — RESULT REVIEW (OUTPATIENT)
Age: 69
End: 2024-04-25

## 2024-04-25 ENCOUNTER — NON-APPOINTMENT (OUTPATIENT)
Age: 69
End: 2024-04-25

## 2024-04-25 DIAGNOSIS — Z90.49 ACQUIRED ABSENCE OF OTHER SPECIFIED PARTS OF DIGESTIVE TRACT: Chronic | ICD-10-CM

## 2024-04-25 DIAGNOSIS — Z98.890 OTHER SPECIFIED POSTPROCEDURAL STATES: Chronic | ICD-10-CM

## 2024-04-25 DIAGNOSIS — J84.9 INTERSTITIAL PULMONARY DISEASE, UNSPECIFIED: ICD-10-CM

## 2024-04-25 PROCEDURE — 93306 TTE W/DOPPLER COMPLETE: CPT | Mod: 26

## 2024-04-25 PROCEDURE — 76376 3D RENDER W/INTRP POSTPROCES: CPT | Mod: 26

## 2024-07-24 NOTE — REASON FOR VISIT
Patient updated and has no further questions.      Alia Rodríguez RN on 2/7/2024 at 2:38 PM     [Follow-Up] : a follow-up visit [ILD] : ILD

## 2024-08-05 ENCOUNTER — APPOINTMENT (OUTPATIENT)
Dept: PULMONOLOGY | Facility: CLINIC | Age: 69
End: 2024-08-05

## 2024-08-05 PROCEDURE — 99214 OFFICE O/P EST MOD 30 MIN: CPT

## 2024-08-05 NOTE — PROCEDURE
[FreeTextEntry1] : Tezpire biologics injection was administered today SQ at Right upper arm. Pt was given ice pack at injection site. No redness. Pt remained in observation for 1 hour. Was feeling well. Evaluated by Dr Garzon and let go home.

## 2024-08-08 NOTE — DISCUSSION/SUMMARY
[FreeTextEntry1] : ---Assessment plan----------The patient has been referred here for further opinion regarding pulmonary problem, - 67-year-old male history of interstitial lung disease ------------ no history of any collagen vascular disorder--- CT suggestive of UIP pattern  ---[ BROTEHR  HAS  IPF]----  1 ILD ----S/P r bronchoscopy IN 2022 TBB  ?POSSIBLE NSIP PATTERN  -- and EVISIA STUDY  NEGATIVE  ------ ??NSIP --- -off prednisone feels much better----needs a repeat 6-minute walk test-CT scan  2023-AS COMPARED TO THAT OF 2021  is stable------   we discussed Ofev  and lung transplant referral [his brother has had lung transplant possible familial IPF  ]  -but wants to wait------ follow-up in 6 months with repeat PFT--systemic hypertension much better controlled---  2   PFT  before next office visit 3 QUANTGOLD  positive---will consider Rifapentine and INH weekly regimen for 3 months pending lung CT results 4-s/p influenza vac --up to date-  5--systemic hypertension much better controlled  f/u in 3-4m---  Thanks for allowing  me to participate  in the care of this patient.  Patient at this time  will follow  the above mentioned recommendations and return back for follow up visit. If you have any questions  I can be reached  at #[phone] (office).  Daniel Garzon MD, [facility]  
[FreeTextEntry1] : ---Assessment plan----------The patient has been referred here for further opinion regarding pulmonary problem, - 67-year-old male history of interstitial lung disease ------------ no history of any collagen vascular disorder--- CT suggestive of UIP pattern  ---[ BROTEHR  HAS  IPF]----  1 ILD ----S/P r bronchoscopy IN 2022 TBB  ?POSSIBLE NSIP PATTERN  -- and EVISIA STUDY  NEGATIVE  ------ ??NSIP --- -off prednisone feels much better----needs a repeat 6-minute walk test-CT scan  2023-AS COMPARED TO THAT OF 2021  is stable------   we discussed Ofev  and lung transplant referral [his brother has had lung transplant possible familial IPF  ]  -but wants to wait------ follow-up in 6 months with repeat PFT--systemic hypertension much better controlled---  2   PFT  before next office visit 3 QUANTGOLD  positive---will consider Rifapentine and INH weekly regimen for 3 months pending lung CT results 4-s/p influenza vac --up to date-  5--systemic hypertension much better controlled  f/u in 3-4m---  Thanks for allowing  me to participate  in the care of this patient.  Patient at this time  will follow  the above mentioned recommendations and return back for follow up visit. If you have any questions  I can be reached  at #[phone] (office).  Daniel Garzon MD, [facility]  
[FreeTextEntry1] : ---Assessment plan----------The patient has been referred here for further opinion regarding pulmonary problem, - 67-year-old male history of interstitial lung disease ------------ no history of any collagen vascular disorder--- CT suggestive of UIP pattern  ---[ BROTEHR  HAS  IPF]----  1 ILD ----S/P r bronchoscopy IN 2022 TBB  ?POSSIBLE NSIP PATTERN  -- and EVISIA STUDY  NEGATIVE  ------ ??NSIP --- -off prednisone feels much better----needs a repeat 6-minute walk test-CT scan  2023-AS COMPARED TO THAT OF 2021  is stable------   we discussed Ofev  and lung transplant referral [his brother has had lung transplant possible familial IPF  ]  -but wants to wait------ follow-up in 6 months with repeat PFT--systemic hypertension much better controlled---  2   PFT  before next office visit 3 QUANTGOLD  positive---will consider Rifapentine and INH weekly regimen for 3 months pending lung CT results 4-s/p influenza vac --up to date-  5--systemic hypertension much better controlled  f/u in 3-4m---  Thanks for allowing  me to participate  in the care of this patient.  Patient at this time  will follow  the above mentioned recommendations and return back for follow up visit. If you have any questions  I can be reached  at #[phone] (office).  Daniel Garzon MD, [facility]  
normal

## 2024-08-08 NOTE — HISTORY OF PRESENT ILLNESS
[Former] : former [TextBox_4] : This letter  is regarding your patient  who  attended pulmonary out patient office today.  I have reviewed  patient's  past history, social history, family history and medication list. I also  reviewed nurse practitioners/ and fellows  notes and assessment and agree with it.   The patient was referred by Dr.ALEX HODGE------- 67  year-old male history of interstitial lung disease ------------ no history of any collagen vascular disorder------- no family history of interstitial lung disease------ BROTHER HAS LUNG TRASNPLANT [CAUSE PT NOT SURE] -----  ------No history of , fever, chills , rigors, chest pain, or hemoptysis. Questionable history of Raynaud's phenomenon. No h/o significant weight loss in last few months. No history of liver dysfunction , collagen vascular disorder or chronic thromboembolic disease. I would classify the patient's dyspnea as WHO  FUNCTIONAL CLASS II--------   PFT 2022-----MILD restriction decreased DLCO 6MWT  2021------523 METERS------ CPET 2021  -----VO2  21 ML/KG/MIN   ----Echo  date------ 2021  ef 65,pasp44  ---stress echo 3/2022  Conclusions:  1. Exercise capacity is 8 METS, which is fair for age and gender. 2. Normal hemodynamic response. 3. Normal electrocardiographic response. 4. Normal augmentation in left ventricular systolic function. 5. Appropriate heart rate response. 6. Appropriate blood pressure response. 7. Normal stress echocardiogram with no evidence of inducible ischemia.  Baseline images demonstrated mild pulmonary hypertension (estimated pulmonary artery systolic pressure 41 mmHg).  The estimated pulmonary artery systolic pressure did not change significantly following peak exercise.  ----Pft date--------- May 2021-----borderline restrictive lung defect is  ----Ct scan date------- 2021----ZWANGER PERISI-----mild interstitial lung disease at the bases with regions of focal honeycombing -----6MWT  525 METERS NO  DESATURATION---------------- ---   CT CHEST - ORDERED BY: ALYSSA JARRETT PROCEDURE DATE: 02/04/2023 CT scan of the chest was obtained without intravenous contrast. FINDINGS: LYMPH NODES: Calcified mediastinal and hilar lymph nodes. HEART/VASCULATURE: Heart size is normal. No pericardial effusion. Coronary artery and aortic calcification. AIRWAYS/LUNGS/PLEURA: No endobronchial lesion. Lower lobe predominant reticular and groundglass opacities with traction bronchiectasis and intralobular septal thickening. No honeycombing. Overall appearance is similar from several prior studies but may be slightly increased from 2020. No pleural effusion or pneumothorax. UPPER ABDOMEN: Unremarkable BONES/SOFT TISSUES: Degenerative changes. IMPRESSION: Pulmonary fibrosis is unchanged from 2021 but may be slightly increased from 2020.       PATHOLOGY  EVISA STUDY- 2022--- NEGATIVE FOR UIP   TB BIOPSY ---PATHOLOGY  2022--   -- MILD NON SPECIFIC chronic inflammation ct chest 8/2021  IMPRESSION: Interstitial lung disease marked by peripheral reticulation, groundglass opacity and traction bronchiectasis with a lower lobe predominance. No honeycombing.  ct chest 4/2022 IMPRESSION:. Findings of pulmonary fibrosis unchanged compared to 8/12/2021  hst did not show KWADWO  10/2021-- ILD--no resp complaints -NNEDS CPET ---NO KWADWO ON PSEG--------WILL TRY SA SHORT  COURSE OF STEROIDS  ---QUNAT GOLD POSITIVE  11/2021----ILD--no resp complaints felt better on steroids----   we will continue on low-dose 7.5 mg p.o. daily-----NEEDS CPET ---NO KWADWO ON PSEG-------  ---QUNAT GOLD POSITIVE  1/2022 ILD-- no resp complaints, continues on pred 7.5mg qd, CPET and 6 minute walk testing reviewed with patient both results wnl  6/2022 ILD--off pred, no resp complaints --- refer to Dr. Leon agreeable for bronchoscopy and EVISIA STUDY-----    AUGUST 2022--  DEVELOPED COVID ---AFEBRILE, COUGH  START PAXLOVID  --HOME LABS    AUGUST 11 2022-------FEELS  BETTER  ON PAXLOVID----CONTD LOW DOSE PREDNISONE-----SAT ,  AFEBRILE,  WILL F//U IN OFFICE IN 2-3 WEEKS TIME  1/2023 ILD- doing well on pred 10mg RECOVERED FROM COVID---DID NOT DO THAT WELL ON 6MWT ON VICKI 3 2023 ---WILL REPEAT CT CHEST    MARCH 2023-----off prednisone feels much better----needs a repeat 6-minute walk test-CT scan  2023-AS COMPARED TO THAT OF 2021  is stable------ we discussed Ofev but wants to wait------ follow-up in 6 months with repeat PFT--systemic hypertension much better controlled Observe off prednisone for 1 ---  10/2023 ILD stable- no present complaints   ----- needs repeat ct chest-pft  -6mwt-------   - we discussed Ofev  and lung transplant referral [his brother has had lung transplant possible familial IPF  ]  -but wants to wait------ follow-up in 6 months with repeat PFT--systemic hypertension much better controlled Observe off prednisone for 1 --- AUGUST 2024------ ILD stable- no present complaints   ----- needs repeat ct chest-pft  ----   - we discussed Ofev  and lung transplant referral [his brother has had lung transplant possible familial IPF  ]  -but wants to wait------ follow-up in 6 months with repeat PFT--systemic hypertension much better controlled - [TextBox_100] : 8/2021 [TextBox_108] : 3.1

## 2024-08-08 NOTE — END OF VISIT
[Time Spent: ___ minutes] : I have spent [unfilled] minutes of time on the encounter. [FreeTextEntry3] :   I, Dr. Daniel Garzon  personally performed the evaluation and management (E/M) services for this established patient who presents today with (a) new problem(s)/exacerbation of (an) existing condition(s). That E/M includes conducting the clinically appropriate interval history &/or exam, assessing all new/exacerbated conditions, and establishing a new plan of care. Today, my MICKEY, Belia Beth NP, , was here to observe my evaluation and management service for this new problem/exacerbated condition and follow the plan of care established by me going forward.

## 2024-09-10 ENCOUNTER — NON-APPOINTMENT (OUTPATIENT)
Age: 69
End: 2024-09-10

## 2024-09-11 ENCOUNTER — NON-APPOINTMENT (OUTPATIENT)
Age: 69
End: 2024-09-11

## 2024-09-11 ENCOUNTER — APPOINTMENT (OUTPATIENT)
Dept: CARDIOLOGY | Facility: CLINIC | Age: 69
End: 2024-09-11
Payer: MEDICARE

## 2024-09-11 VITALS
WEIGHT: 149 LBS | DIASTOLIC BLOOD PRESSURE: 71 MMHG | SYSTOLIC BLOOD PRESSURE: 126 MMHG | HEIGHT: 63 IN | OXYGEN SATURATION: 98 % | BODY MASS INDEX: 26.4 KG/M2 | HEART RATE: 64 BPM

## 2024-09-11 DIAGNOSIS — I27.0 PRIMARY PULMONARY HYPERTENSION: ICD-10-CM

## 2024-09-11 DIAGNOSIS — I10 ESSENTIAL (PRIMARY) HYPERTENSION: ICD-10-CM

## 2024-09-11 PROCEDURE — 99214 OFFICE O/P EST MOD 30 MIN: CPT

## 2024-09-11 PROCEDURE — 93000 ELECTROCARDIOGRAM COMPLETE: CPT

## 2024-09-11 NOTE — HISTORY OF PRESENT ILLNESS
[FreeTextEntry1] : 68 yr M with HTN , IPF with no known CV ds.   Patient states that he is doing very well he is very active exercising and he has no symptoms.

## 2024-09-11 NOTE — DISCUSSION/SUMMARY
[FreeTextEntry1] : PHT: FU with Dr Garzon  HTN: Blood pressure is in normal range recommend he continue his current medications that includes amlodipine 5 mg daily lisinopril 10 mg daily.    Follow-up with us in 6 months [EKG obtained to assist in diagnosis and management of assessed problem(s)] : EKG obtained to assist in diagnosis and management of assessed problem(s)

## 2024-09-11 NOTE — PHYSICAL EXAM
[Well Developed] : well developed [Well Nourished] : well nourished [No Acute Distress] : no acute distress [Normal Conjunctiva] : normal conjunctiva [Normal Venous Pressure] : normal venous pressure [No Carotid Bruit] : no carotid bruit [Normal S1, S2] : normal S1, S2 [No Murmur] : no murmur [No Rub] : no rub [No Gallop] : no gallop [Soft] : abdomen soft [Non Tender] : non-tender [No Masses/organomegaly] : no masses/organomegaly [Normal Bowel Sounds] : normal bowel sounds [Normal Gait] : normal gait [No Edema] : no edema [No Cyanosis] : no cyanosis [No Clubbing] : no clubbing [No Varicosities] : no varicosities [No Rash] : no rash [No Skin Lesions] : no skin lesions [Moves all extremities] : moves all extremities [No Focal Deficits] : no focal deficits [Normal Speech] : normal speech [Alert and Oriented] : alert and oriented [Normal memory] : normal memory [de-identified] : B/L  fine crackles at the bases.

## 2024-12-05 ENCOUNTER — APPOINTMENT (OUTPATIENT)
Dept: PULMONOLOGY | Facility: CLINIC | Age: 69
End: 2024-12-05
Payer: MEDICARE

## 2024-12-05 ENCOUNTER — MED ADMIN CHARGE (OUTPATIENT)
Age: 69
End: 2024-12-05

## 2024-12-05 VITALS
WEIGHT: 149.6 LBS | DIASTOLIC BLOOD PRESSURE: 71 MMHG | SYSTOLIC BLOOD PRESSURE: 128 MMHG | HEART RATE: 71 BPM | BODY MASS INDEX: 26.5 KG/M2 | OXYGEN SATURATION: 98 %

## 2024-12-05 DIAGNOSIS — J84.9 INTERSTITIAL PULMONARY DISEASE, UNSPECIFIED: ICD-10-CM

## 2024-12-05 PROCEDURE — 99215 OFFICE O/P EST HI 40 MIN: CPT | Mod: 25

## 2024-12-05 PROCEDURE — 94010 BREATHING CAPACITY TEST: CPT

## 2024-12-05 PROCEDURE — 90662 IIV NO PRSV INCREASED AG IM: CPT

## 2024-12-05 PROCEDURE — ZZZZZ: CPT

## 2024-12-05 PROCEDURE — 94618 PULMONARY STRESS TESTING: CPT

## 2024-12-05 PROCEDURE — 94729 DIFFUSING CAPACITY: CPT

## 2024-12-05 PROCEDURE — 99214 OFFICE O/P EST MOD 30 MIN: CPT | Mod: 25

## 2024-12-05 PROCEDURE — 94726 PLETHYSMOGRAPHY LUNG VOLUMES: CPT

## 2024-12-05 PROCEDURE — G0008: CPT

## 2025-04-30 DIAGNOSIS — J84.9 INTERSTITIAL PULMONARY DISEASE, UNSPECIFIED: ICD-10-CM

## 2025-05-03 ENCOUNTER — NON-APPOINTMENT (OUTPATIENT)
Age: 70
End: 2025-05-03

## 2025-05-05 ENCOUNTER — APPOINTMENT (OUTPATIENT)
Dept: PULMONOLOGY | Facility: CLINIC | Age: 70
End: 2025-05-05
Payer: MEDICARE

## 2025-05-05 VITALS
SYSTOLIC BLOOD PRESSURE: 134 MMHG | WEIGHT: 149 LBS | BODY MASS INDEX: 26.4 KG/M2 | OXYGEN SATURATION: 98 % | HEIGHT: 63 IN | DIASTOLIC BLOOD PRESSURE: 74 MMHG | HEART RATE: 52 BPM

## 2025-05-05 PROCEDURE — 94375 RESPIRATORY FLOW VOLUME LOOP: CPT | Mod: 59

## 2025-05-05 PROCEDURE — 94621 CARDIOPULM EXERCISE TESTING: CPT

## 2025-05-05 PROCEDURE — 94729 DIFFUSING CAPACITY: CPT

## 2025-05-05 PROCEDURE — 94375 RESPIRATORY FLOW VOLUME LOOP: CPT

## 2025-05-05 PROCEDURE — 94726 PLETHYSMOGRAPHY LUNG VOLUMES: CPT

## 2025-05-05 PROCEDURE — 94621 CARDIOPULM EXERCISE TESTING: CPT | Mod: 59

## 2025-05-05 PROCEDURE — 94010 BREATHING CAPACITY TEST: CPT

## 2025-05-22 ENCOUNTER — NON-APPOINTMENT (OUTPATIENT)
Age: 70
End: 2025-05-22

## 2025-05-27 ENCOUNTER — APPOINTMENT (OUTPATIENT)
Dept: CT IMAGING | Facility: CLINIC | Age: 70
End: 2025-05-27
Payer: MEDICARE

## 2025-05-27 ENCOUNTER — OUTPATIENT (OUTPATIENT)
Dept: OUTPATIENT SERVICES | Facility: HOSPITAL | Age: 70
LOS: 1 days | End: 2025-05-27
Payer: COMMERCIAL

## 2025-05-27 DIAGNOSIS — Z00.8 ENCOUNTER FOR OTHER GENERAL EXAMINATION: ICD-10-CM

## 2025-05-27 DIAGNOSIS — Z98.890 OTHER SPECIFIED POSTPROCEDURAL STATES: Chronic | ICD-10-CM

## 2025-05-27 DIAGNOSIS — Z90.49 ACQUIRED ABSENCE OF OTHER SPECIFIED PARTS OF DIGESTIVE TRACT: Chronic | ICD-10-CM

## 2025-05-27 DIAGNOSIS — J84.9 INTERSTITIAL PULMONARY DISEASE, UNSPECIFIED: ICD-10-CM

## 2025-05-27 PROCEDURE — 71250 CT THORAX DX C-: CPT

## 2025-05-27 PROCEDURE — 71250 CT THORAX DX C-: CPT | Mod: 26

## 2025-06-03 ENCOUNTER — APPOINTMENT (OUTPATIENT)
Dept: PULMONOLOGY | Facility: CLINIC | Age: 70
End: 2025-06-03

## 2025-06-03 VITALS
WEIGHT: 152 LBS | HEIGHT: 63 IN | RESPIRATION RATE: 17 BRPM | HEART RATE: 52 BPM | OXYGEN SATURATION: 96 % | DIASTOLIC BLOOD PRESSURE: 73 MMHG | SYSTOLIC BLOOD PRESSURE: 118 MMHG | BODY MASS INDEX: 26.93 KG/M2

## 2025-06-03 DIAGNOSIS — J84.9 INTERSTITIAL PULMONARY DISEASE, UNSPECIFIED: ICD-10-CM

## 2025-06-03 PROCEDURE — 99214 OFFICE O/P EST MOD 30 MIN: CPT

## 2025-06-04 LAB
ALBUMIN SERPL ELPH-MCNC: 4.7 G/DL
ALP BLD-CCNC: 77 U/L
ALT SERPL-CCNC: 23 U/L
ANION GAP SERPL CALC-SCNC: 13 MMOL/L
AST SERPL-CCNC: 21 U/L
BASOPHILS # BLD AUTO: 0.07 K/UL
BASOPHILS NFR BLD AUTO: 0.9 %
BILIRUB SERPL-MCNC: 0.2 MG/DL
BUN SERPL-MCNC: 18 MG/DL
CALCIUM SERPL-MCNC: 10 MG/DL
CHLORIDE SERPL-SCNC: 101 MMOL/L
CO2 SERPL-SCNC: 23 MMOL/L
CREAT SERPL-MCNC: 0.81 MG/DL
EGFRCR SERPLBLD CKD-EPI 2021: 96 ML/MIN/1.73M2
EOSINOPHIL # BLD AUTO: 0.57 K/UL
EOSINOPHIL NFR BLD AUTO: 7.2 %
GLUCOSE SERPL-MCNC: 106 MG/DL
HCT VFR BLD CALC: 41.7 %
HGB BLD-MCNC: 13.4 G/DL
IMM GRANULOCYTES NFR BLD AUTO: 0.4 %
LYMPHOCYTES # BLD AUTO: 2.5 K/UL
LYMPHOCYTES NFR BLD AUTO: 31.5 %
MAN DIFF?: NORMAL
MCHC RBC-ENTMCNC: 27.9 PG
MCHC RBC-ENTMCNC: 32.1 G/DL
MCV RBC AUTO: 86.7 FL
MONOCYTES # BLD AUTO: 0.63 K/UL
MONOCYTES NFR BLD AUTO: 7.9 %
NEUTROPHILS # BLD AUTO: 4.13 K/UL
NEUTROPHILS NFR BLD AUTO: 52.1 %
NT-PROBNP SERPL-MCNC: 47 PG/ML
PLATELET # BLD AUTO: 250 K/UL
POTASSIUM SERPL-SCNC: 5 MMOL/L
PROT SERPL-MCNC: 7.9 G/DL
RBC # BLD: 4.81 M/UL
RBC # FLD: 12.9 %
SODIUM SERPL-SCNC: 138 MMOL/L
WBC # FLD AUTO: 7.93 K/UL

## 2025-06-06 RX ORDER — NINTEDANIB 100 MG/1
100 CAPSULE ORAL
Qty: 60 | Refills: 2 | Status: ACTIVE | COMMUNITY
Start: 2025-06-03 | End: 1900-01-01

## 2025-09-10 ENCOUNTER — APPOINTMENT (OUTPATIENT)
Dept: CARDIOLOGY | Facility: CLINIC | Age: 70
End: 2025-09-10

## 2025-09-10 VITALS
WEIGHT: 152 LBS | OXYGEN SATURATION: 98 % | SYSTOLIC BLOOD PRESSURE: 117 MMHG | HEIGHT: 63 IN | DIASTOLIC BLOOD PRESSURE: 66 MMHG | HEART RATE: 55 BPM | BODY MASS INDEX: 26.93 KG/M2

## (undated) DEVICE — BRUSH CYTO DISP

## (undated) DEVICE — SUTURE REMOVAL KIT

## (undated) DEVICE — GLV 7 PROTEXIS (WHITE)

## (undated) DEVICE — SOL IRR POUR NS 0.9% 500ML

## (undated) DEVICE — FORCEP BIOPSY BRONCHOSCOPE DISP

## (undated) DEVICE — PACK BRONCHOSCOPY

## (undated) DEVICE — VALVE BIOPSY BRONCHOVIDEOSCOPE

## (undated) DEVICE — SYR LUER SLIP TIP 30CC

## (undated) DEVICE — FORCEP BIOPSY OVAL CUP DISP

## (undated) DEVICE — VALVE SUCTION EVIS 160/200/240

## (undated) DEVICE — WARMING BLANKET LOWER ADULT

## (undated) DEVICE — SYR LUER LOK 10CC

## (undated) DEVICE — SOL INJ NS 0.9% 100ML

## (undated) DEVICE — STOPCOCK 4-WAY (BLUE) DISCOFIX SPIN-LOCK CONNECTOR

## (undated) DEVICE — ADAPTER FIBEROPTIC BRONCHOSCOPE DUAL AXIS SWIVEL

## (undated) DEVICE — MASK SURGICAL WITH EYESHIELD ANTIFOG (ORANGE)

## (undated) DEVICE — DRAPE TOWEL BLUE 17" X 24"

## (undated) DEVICE — BALLOON SINGLE FOR BF-UC160F

## (undated) DEVICE — NDL ASPIRATION VIZISHOT2 22G

## (undated) DEVICE — DRSG CURITY GAUZE SPONGE 4 X 4" 12-PLY

## (undated) DEVICE — SYR LUER LOK 20CC

## (undated) DEVICE — DRSG TAPE TRANSPORE 1"

## (undated) DEVICE — TRAP SPECIMEN SPUTUM 40CC